# Patient Record
Sex: FEMALE | Race: WHITE | NOT HISPANIC OR LATINO
[De-identification: names, ages, dates, MRNs, and addresses within clinical notes are randomized per-mention and may not be internally consistent; named-entity substitution may affect disease eponyms.]

---

## 2022-04-04 PROBLEM — Z00.00 ENCOUNTER FOR PREVENTIVE HEALTH EXAMINATION: Status: ACTIVE | Noted: 2022-04-04

## 2022-04-06 DIAGNOSIS — Z87.19 PERSONAL HISTORY OF OTHER DISEASES OF THE DIGESTIVE SYSTEM: ICD-10-CM

## 2022-04-06 DIAGNOSIS — Z78.9 OTHER SPECIFIED HEALTH STATUS: ICD-10-CM

## 2022-04-06 DIAGNOSIS — Z86.39 PERSONAL HISTORY OF OTHER ENDOCRINE, NUTRITIONAL AND METABOLIC DISEASE: ICD-10-CM

## 2022-04-06 DIAGNOSIS — R73.03 PREDIABETES.: ICD-10-CM

## 2022-04-20 ENCOUNTER — NON-APPOINTMENT (OUTPATIENT)
Age: 67
End: 2022-04-20

## 2022-04-20 ENCOUNTER — APPOINTMENT (OUTPATIENT)
Dept: SURGERY | Facility: CLINIC | Age: 67
End: 2022-04-20
Payer: MEDICARE

## 2022-04-20 VITALS
DIASTOLIC BLOOD PRESSURE: 82 MMHG | WEIGHT: 131 LBS | BODY MASS INDEX: 30.32 KG/M2 | TEMPERATURE: 97.2 F | HEART RATE: 71 BPM | SYSTOLIC BLOOD PRESSURE: 146 MMHG | HEIGHT: 55 IN

## 2022-04-20 DIAGNOSIS — K64.5 PERIANAL VENOUS THROMBOSIS: ICD-10-CM

## 2022-04-20 PROCEDURE — 99203 OFFICE O/P NEW LOW 30 MIN: CPT

## 2022-04-20 NOTE — PHYSICAL EXAM
[JVD] : no jugular venous distention  [Normal Breath Sounds] : Normal breath sounds [Normal Rate and Rhythm] : normal rate and rhythm [No Rash or Lesion] : No rash or lesion [de-identified] : NAD, well appearing [de-identified] : soft, NT/ND [de-identified] : large thrombosed external hemorrhoid, internal hemorrhoids on anascopy

## 2022-04-20 NOTE — HISTORY OF PRESENT ILLNESS
[de-identified] : 66 yo F presenting for evaluation of hemorrhoids [de-identified] : Patient states she has had the hemorrhoids for three months.  Noted to have thrombosed hemorrhoid by her pcp and given wipes and cream but with minimal relief.  Patient states she has always had issues with constipation.  She takes senna as needed.  Also reports anal bleeding and discomfort with sitting.

## 2022-04-20 NOTE — CONSULT LETTER
[Dear  ___] : Dear ~TIN, [( Thank you for referring [unfilled] for consultation for _____ )] : Thank you for referring [unfilled] for consultation for [unfilled] [Please see my note below.] : Please see my note below. [Referral Closing:] : Thank you very much for seeing this patient.  If you have any questions, please do not hesitate to contact me. [Sincerely,] : Sincerely, [FreeTextEntry3] : [place] Gongora

## 2022-05-13 ENCOUNTER — APPOINTMENT (OUTPATIENT)
Dept: SURGERY | Facility: CLINIC | Age: 67
End: 2022-05-13
Payer: MEDICARE

## 2022-05-13 VITALS
TEMPERATURE: 97.3 F | SYSTOLIC BLOOD PRESSURE: 115 MMHG | HEART RATE: 81 BPM | WEIGHT: 129 LBS | BODY MASS INDEX: 29.85 KG/M2 | HEIGHT: 55 IN | DIASTOLIC BLOOD PRESSURE: 71 MMHG

## 2022-05-13 PROCEDURE — 99212 OFFICE O/P EST SF 10 MIN: CPT

## 2022-05-13 NOTE — PHYSICAL EXAM
[JVD] : no jugular venous distention  [Normal Breath Sounds] : Normal breath sounds [Normal Rate and Rhythm] : normal rate and rhythm [No Rash or Lesion] : No rash or lesion [Calm] : calm [de-identified] : nad [de-identified] : soft, NT [de-identified] : large thrombosed external hemorrhoid with no change in appearance from prior exam

## 2022-05-13 NOTE — CONSULT LETTER
[Dear  ___] : Dear  [unfilled], [Referral Letter:] : I am referring [unfilled] to you for further evaluation.  My most recent evaluation follows. [Please see my note below.] : Please see my note below. [Referral Closing:] : Thank you very much for seeing this patient.  If you have any questions, please do not hesitate to contact me. [Sincerely,] : Sincerely, [FreeTextEntry3] : [place] Gongora

## 2022-05-13 NOTE — HISTORY OF PRESENT ILLNESS
[de-identified] : thrombosed hemorrhoid [de-identified] : 66 yo F with pain, and bleeding from thrombosed hemorrhoid last seen in april.  Patient states she has been doing daily sitz baths and fiber supplements.  Also topical lidocaine cream with minimal relief.  Still having very bothersome bleeding.

## 2022-05-23 ENCOUNTER — APPOINTMENT (OUTPATIENT)
Dept: SURGERY | Facility: CLINIC | Age: 67
End: 2022-05-23
Payer: MEDICARE

## 2022-05-23 VITALS
WEIGHT: 129 LBS | SYSTOLIC BLOOD PRESSURE: 115 MMHG | HEIGHT: 55 IN | HEART RATE: 81 BPM | BODY MASS INDEX: 29.85 KG/M2 | DIASTOLIC BLOOD PRESSURE: 71 MMHG | TEMPERATURE: 97.1 F

## 2022-05-23 PROCEDURE — 11104 PUNCH BX SKIN SINGLE LESION: CPT

## 2022-05-23 PROCEDURE — 99213 OFFICE O/P EST LOW 20 MIN: CPT | Mod: 25

## 2022-05-23 NOTE — PHYSICAL EXAM
[Abdomen Masses] : No abdominal masses [Abdomen Tenderness] : ~T No ~M abdominal tenderness [Normal] : was normal [None] : no [JVD] : no jugular venous distention  [Respiratory Effort] : normal respiratory effort [Alert] : alert [Calm] : calm [de-identified] : Large perianal mass, anterior based, approximately 4 cm in size at greatest dimension, arises from anal verge extending barely into anal canal. Nontender, minimal sensation on palpation.  [de-identified] : No acute distress [de-identified] : No lymphadenopathy appreciated in either groin.

## 2022-05-23 NOTE — PROCEDURE
[FreeTextEntry1] : Skin punch biopsy used to take full thickness biopsy of mass. Patient tolerated procedure without issue. Biopsy site cauterized with silver nitrate.

## 2022-05-23 NOTE — ASSESSMENT
[FreeTextEntry1] : 67 year old female with enlarging perianal mass, most likely consistent with invasive anal cancer. \par \par Biopsy pending for diagnosis. Discussed with patient and family potential for invasive anal cancer. If pathology consistent will need further staging workup to then decide next steps including chemotherapy and radiation. \par \par If biopsy is inadequate or inconclusive will potentially need exam under anesthesia for a larger specimen for definitive diagnosis. Will discuss her case with Dr Hutchins, his office number was provided. \par \par Await pathology results.\par Follow up to discuss results and next steps.

## 2022-05-23 NOTE — HISTORY OF PRESENT ILLNESS
[FreeTextEntry1] : 67 year old female here for initial visit for perianal mass, previously seen by Dr Gongora for possible thrombosed hemorrhoid. Symptoms were not improving on follow up so she was referred here for concern that this was not hemorrhoid related and potentially consistent with a mass. \par \par Patient had colonoscopy in August of last year. Believes she had these same symptoms at that time but smaller. Has bleeding with bowel movements, also has bleeding through her underwear sometimes. Seen by PCP in march who noted a 2cm perianal mass consistent with thrombosed hemorrhoid at that time. Since then patient's symptoms have not improved and she believes this lump is getting bigger. Has not noted any swelling in the groins. \par \par No family history of cancer. Medical history significant for prediabetes.

## 2022-05-27 ENCOUNTER — TRANSCRIPTION ENCOUNTER (OUTPATIENT)
Age: 67
End: 2022-05-27

## 2022-05-31 ENCOUNTER — APPOINTMENT (OUTPATIENT)
Dept: RADIATION ONCOLOGY | Facility: CLINIC | Age: 67
End: 2022-05-31
Payer: MEDICARE

## 2022-05-31 VITALS
HEART RATE: 69 BPM | DIASTOLIC BLOOD PRESSURE: 68 MMHG | BODY MASS INDEX: 29.39 KG/M2 | HEIGHT: 55 IN | RESPIRATION RATE: 17 BRPM | TEMPERATURE: 97.1 F | WEIGHT: 127 LBS | OXYGEN SATURATION: 97 % | SYSTOLIC BLOOD PRESSURE: 108 MMHG

## 2022-05-31 DIAGNOSIS — Z87.19 PERSONAL HISTORY OF OTHER DISEASES OF THE DIGESTIVE SYSTEM: ICD-10-CM

## 2022-05-31 PROCEDURE — 99205 OFFICE O/P NEW HI 60 MIN: CPT | Mod: 25

## 2022-05-31 NOTE — VITALS
[Maximal Pain Intensity: 3/10] : 3/10 [Least Pain Intensity: 2/10] : 2/10 [NoTreatment Scheduled] : no treatment scheduled [90: Able to carry normal activity; minor signs or symptoms of disease.] : 90: Able to carry normal activity; minor signs or symptoms of disease.  [90: Minor restrictions in physically strenous activity.] : 90: Minor restrictions in physically strenuous activity. [ECOG Performance Status: 1 - Restricted in physically strenuous activity but ambulatory and able to carry out work of a light or sedentary nature] : Performance Status: 1 - Restricted in physically strenuous activity but ambulatory and able to carry out work of a light or sedentary nature, e.g., light house work, office work

## 2022-06-01 PROBLEM — Z87.19 HISTORY OF HEMORRHOIDS: Status: RESOLVED | Noted: 2022-04-20 | Resolved: 2022-06-01

## 2022-06-01 NOTE — PHYSICAL EXAM
[] : no respiratory distress [Abdomen Soft] : soft [Nondistended] : nondistended [Abdomen Tenderness] : non-tender [Normal Vagina] : normal vagina without lesions or masses [Cervical Lymph Nodes Enlarged Posterior Bilaterally] : posterior cervical [Cervical Lymph Nodes Enlarged Anterior Bilaterally] : anterior cervical [Supraclavicular Lymph Nodes Enlarged Bilaterally] : supraclavicular [Axillary Lymph Nodes Enlarged Bilaterally] : axillary [Inguinal Lymph Nodes Enlarged Bilaterally] : inguinal [Normal] : oriented to person, place and time, the affect was normal, the mood was normal and not anxious [FreeTextEntry1] : Fungating friable mass originating from the anterior anal verge without extension into the anal canal; no masses within the anal canal; the lesion does no obstruct the anal canal and I was able to advance my examining finger beyond the mass into the anorectum; there is evidence of mild bleeding on the maxi pad patient is using [de-identified] : No palpable vaginal masses; no vaginal bleeding noted [de-identified] : cranial nerves grossly intact; 4/5 strength right upper and lower extremities; strength is intact on the left; no sensory changes; intact finger-nose-finger and rapid alternating movements

## 2022-06-01 NOTE — REASON FOR VISIT
[Other: ___] : [unfilled] [Consideration for Non-Curative Therapy] : consideration for non-curative therapy for

## 2022-06-01 NOTE — REVIEW OF SYSTEMS
[Abdominal Pain] : abdominal pain [Constipation] : constipation [Negative] : Allergic/Immunologic [de-identified] : Weakness on the right side

## 2022-06-01 NOTE — HISTORY OF PRESENT ILLNESS
[FreeTextEntry1] : This is a 67 year-old woman with a new diagnosis of anal melanoma.\par \par The patient presented with what she thought was a hemorrhoid. \par \par She was evaluated and referred to Dr. Jordan for further evaluation as the lesion at the anal verge appeared cancerous. The patient underwent biopsy by Dr. Jordan on 5/23 for what appeared to be an anal cancer.\par \par Before the pathology resulted, the patient's family brought the patient to the ED on 5/25 because she experiencing episodes of confusion and right-sided weakness.\par \par MRI of brain performed on 5/25/2022. abnormal T2/FLAIR signal in the left caudate head, left caudate body, left corona radiata, left frontal subcortical white matter, left anterior/ inferior frontal cortex, and left temporal cortex with abnormal patchy and nodular enhancement on postcontrast image. No significant regional mass effect. No abnormal DWI signal within the corresponding areas. Considerations include subacute infarcts, metastatic disease, or post infectious etiologies.  \par \par CT of chest, abdomen and pelvis was done on 5/26/2022. Poorly delineated primary anal cancer with presumed pulmonary and hepatic metastases. Suspicious left inguinal lymph node. \par \par Pathology on 5/23/2022 report revealed, "anus mass, biopsy; Malignant melanoma;  immunihistochemical stains performed to revealed tumor cells to be positive with melanocytic markers, S100, MART-1 and HMB-45. Pancytokeratin, p40, p16, and neuroendocrine markers (chromogranin and synaptophysin) are negative. Ki-67 proliferative index is high (up to 60%). CD45 immunihistochemical stain highlight background inflammatory cells. \par \par The patient presents today to discuss the role of palliative radiation in her management. She was started on steoirds in the hospital which her family states has improved her neurological symptoms. She is less confused and her right sided weakness is improved.\par \par The patient has not had a BM in 5 days but she states that she does not have difficulty passing stool, she just feels constipated. She has abdominal pain but no pain in the anal region. She does have mild bleeding from the mass but has not required blood transfusions and does not feel fatigues.

## 2022-06-02 ENCOUNTER — APPOINTMENT (OUTPATIENT)
Dept: HEMATOLOGY ONCOLOGY | Facility: CLINIC | Age: 67
End: 2022-06-02
Payer: MEDICARE

## 2022-06-02 VITALS
RESPIRATION RATE: 18 BRPM | SYSTOLIC BLOOD PRESSURE: 115 MMHG | BODY MASS INDEX: 29.16 KG/M2 | WEIGHT: 126 LBS | OXYGEN SATURATION: 64 % | DIASTOLIC BLOOD PRESSURE: 74 MMHG | HEART RATE: 69 BPM | HEIGHT: 55 IN | TEMPERATURE: 98.3 F

## 2022-06-02 DIAGNOSIS — Z80.0 FAMILY HISTORY OF MALIGNANT NEOPLASM OF DIGESTIVE ORGANS: ICD-10-CM

## 2022-06-02 DIAGNOSIS — Z72.89 OTHER PROBLEMS RELATED TO LIFESTYLE: ICD-10-CM

## 2022-06-02 DIAGNOSIS — Z80.8 FAMILY HISTORY OF MALIGNANT NEOPLASM OF OTHER ORGANS OR SYSTEMS: ICD-10-CM

## 2022-06-02 PROCEDURE — 99205 OFFICE O/P NEW HI 60 MIN: CPT

## 2022-06-02 RX ORDER — LORATADINE 10 MG
17 TABLET,DISINTEGRATING ORAL
Refills: 0 | Status: ACTIVE | COMMUNITY

## 2022-06-02 RX ORDER — LEVETIRACETAM 500 MG/1
500 TABLET, FILM COATED ORAL
Refills: 0 | Status: ACTIVE | COMMUNITY

## 2022-06-03 ENCOUNTER — NON-APPOINTMENT (OUTPATIENT)
Age: 67
End: 2022-06-03

## 2022-06-04 NOTE — REVIEW OF SYSTEMS
[Vision Problems] : vision problems [Constipation] : constipation [Confused] : confusion [Negative] : Allergic/Immunologic [FreeTextEntry7] : Anal pain [de-identified] : headaches, right sided weakness

## 2022-06-04 NOTE — ASSESSMENT
[FreeTextEntry1] : This a very pleasant 67-year-old woman with a newly diagnosed metastatic melanoma.  She was also recently found to have symptomatic brain mets, if not leptomeningeal disease.  Her symptoms have improved with the institution of corticosteroids.  She has been seen by radiation therapy and given the extent of her disease is being scheduled for whole brain radiotherapy.\par \par -I will obtain a PET/CT scan to assess the extent of her disease.\par -Full oncological laboratories are being sent at the time this office visit.\par -I have requested NGS testing on her pathological sample to include BRAF, KIT, NTRK, and TMB testing.\par -At this time, given the significantly symptomatic nature of her CNS disease I agree to move forward with radiation therapy.  I have discussed her care with her radiation oncologist and have asked that she be tapered off steroids as soon as possible.  If due immune therapy agents are considered the simultaneous use of corticosteroids may blunt the response to such agents.\par -Additionally, if she is found to have a BRAF mutation then this could be targeted during radiation along with the MEK inhibitor.\par -I had to, unfortunately, discussed the incurable nature of a metastatic melanoma but explained the palliative nature of treatment.  She, her son, and daughter-in-law expressed understanding and wish to move forward with her treatment.\par -She has been referred to the [facility] as well as to palliative care.\par \par Thank you very much for allow me participate in this woman's medical care.  She have any questions or concerns please not hesitate to call me directly.

## 2022-06-04 NOTE — HISTORY OF PRESENT ILLNESS
[0 - No Distress] : Distress Level: 0 [Disease: _____________________] : Disease: [unfilled] [AJCC Stage: ____] : AJCC Stage: [unfilled] [de-identified] : Mrs. Yoder is a 67 year old female who presents for initial consultation for newly diagnosed Metastatic Melanoma. \par She first came to medical attention approximately approximately 3 months ago when she began to note rectal bleeding.  Initially it was thought that she had thrombosed hemorrhoids, however, perianal mass was noted measuring approximately 2 cm.  On 5/23/2022 she underwent anal biopsy.  While awaiting the pathology results she began to experience lack of coordination, right-sided weakness and change in mental status with confusion.  MRI of the brain on 5/25/2022 which showed abnormal signal in the left caudate head, left caudate body, left corona radiata, left frontal subcortical white matter, left anterior/inferior frontal cortex and left temporal cortex considerations including metastatic disease.  CT of the chest, abdomen and pelvis on 5/26/2022 showed multiple solid and ground glass pulmonary nodules measuring less than 6 mm and multiple bilobar metastasis in the liver more numerous in the right hepatic lobe.  Pathology from the anal mass biopsy showed tumor cells positive for melanocytic markers consistent with malignant melanoma.  She consulted with radiation oncology and is not a candidate for gamma knife radiation oncology and will undergo palliative whole brain radiation as she is not a candidate for gamma knife because of multiple areas involved.  She has also been started on Decadron and reports some improvement in her neurologic symptoms but is experiencing headaches and visual field disturbances.  She denies any noticeable skin lesions and has not had previous dermatologic skin survey.  She reports having anal discomfort and has been constipated despite laxatives.

## 2022-06-08 ENCOUNTER — APPOINTMENT (OUTPATIENT)
Dept: NEUROSURGERY | Facility: CLINIC | Age: 67
End: 2022-06-08

## 2022-06-08 ENCOUNTER — APPOINTMENT (OUTPATIENT)
Dept: GERIATRICS | Facility: CLINIC | Age: 67
End: 2022-06-08
Payer: MEDICARE

## 2022-06-08 VITALS
DIASTOLIC BLOOD PRESSURE: 60 MMHG | SYSTOLIC BLOOD PRESSURE: 103 MMHG | WEIGHT: 127.31 LBS | TEMPERATURE: 97.6 F | HEART RATE: 67 BPM | RESPIRATION RATE: 16 BRPM | BODY MASS INDEX: 29.46 KG/M2 | HEIGHT: 55 IN | OXYGEN SATURATION: 99 %

## 2022-06-08 PROCEDURE — 99205 OFFICE O/P NEW HI 60 MIN: CPT

## 2022-06-08 RX ORDER — DOCUSATE SODIUM 100 MG/1
100 CAPSULE ORAL 3 TIMES DAILY
Qty: 270 | Refills: 3 | Status: ACTIVE | COMMUNITY
Start: 2022-06-08 | End: 1900-01-01

## 2022-06-08 RX ORDER — MIRTAZAPINE 15 MG/1
15 TABLET, FILM COATED ORAL
Qty: 30 | Refills: 0 | Status: ACTIVE | COMMUNITY
Start: 2022-06-08 | End: 1900-01-01

## 2022-06-08 RX ORDER — ACETAMINOPHEN 500 MG/1
500 TABLET, COATED ORAL 3 TIMES DAILY
Qty: 180 | Refills: 5 | Status: ACTIVE | COMMUNITY
Start: 2022-06-08 | End: 1900-01-01

## 2022-06-08 NOTE — HISTORY OF PRESENT ILLNESS
[FreeTextEntry1] : pt is a 68 y/o F with newly diagnosed metastatic melanoma here to establish care with palliative \par pt being followed by Dr. Hutchins. 3 months ago pt noted rectal bleeding though to be hemorrhoids, found to have perianal mass and underwent biopsy with results positive for malignant melanoma. Further imaging revealed metastatic disease in the Brain, lung, and liver. \par pt not a candidate for gamma knife and undergoing palliative whole brain radiation. has not started yet, awaiting appointmetn for simulation. \par \par pt with neurological symptoms of gait imbalance, headaches and visual disturbance, word finding.\par \par  reports pain in perianal area, constipation and perianal bleeding. \par pt unable to sleep at night stays awake reporting fear and sadness regarding her diagnosis \par no other symptoms\par \par pt lives with her  son Hi Ye and his wife and 2 small grandchildren \par \par pt is seperated from her , pt recently relocated from [place] to [place] to be with family \par \par \par \par \par

## 2022-06-08 NOTE — REVIEW OF SYSTEMS
[As Noted in HPI] : as noted in HPI [Feeling Poorly] : feeling poorly [Feeling Tired] : feeling tired [Negative] : Constitutional

## 2022-06-08 NOTE — PHYSICAL EXAM
[General Appearance - Alert] : alert [General Appearance - In No Acute Distress] : in no acute distress [General Appearance - Well Nourished] : well nourished [General Appearance - Well Developed] : well developed [General Appearance - Well-Appearing] : healthy appearing [Sclera] : the sclera and conjunctiva were normal [Normal Oral Mucosa] : normal oral mucosa [No Oral Pallor] : no oral pallor [Outer Ear] : the ears and nose were normal in appearance [Nasal Cavity] : the nasal mucosa and septum were normal [Respiration, Rhythm And Depth] : normal respiratory rhythm and effort [Exaggerated Use Of Accessory Muscles For Inspiration] : no accessory muscle use [Auscultation Breath Sounds / Voice Sounds] : lungs were clear to auscultation bilaterally [Heart Rate And Rhythm] : heart rate was normal and rhythm regular [Heart Sounds] : normal S1 and S2 [Murmurs] : no murmurs [Full Pulse] : the pedal pulses are present [Edema] : there was no peripheral edema [Abdomen Soft] : soft [Abdomen Tenderness] : non-tender [] : no hepato-splenomegaly [Abdomen Mass (___ Cm)] : no abdominal mass palpated [Abdomen Hernia] : no hernia was discovered [FreeTextEntry1] : + large perianal mass, 1cm x 5 cm x 5 cm with noted areas of bleeding, no sign of infection, partially cover anal opening friable tissue, rectum patent  [Abnormal Walk] : normal gait [Nail Clubbing] : no clubbing  or cyanosis of the fingernails [Involuntary Movements] : no involuntary movements were seen [Musculoskeletal - Swelling] : no joint swelling seen [Cranial Nerves] : cranial nerves 2-12 were intact [Oriented To Time, Place, And Person] : oriented to person, place, and time [Impaired Insight] : insight and judgment were intact

## 2022-06-08 NOTE — ASSESSMENT
[FreeTextEntry1] : pt is a pleasant woman with newly diagnosed metastatic melanoma with disease to brain, lung and liver currently awaiting to undergo full brain radiation on steroids, once tapered will start dual immunotherapy with Dr. Hutchins pps of 60% noted difficult word finding and memory loss\par \par pt reports her 2 sons alvarez and Aaron are her designated HCP\par will fill form--sons are medical surrogate by [facility]\par will cariilo [phone]\par aaron hearn-lives in [place]\par telephone family meeting 06/20/2022 arranged to discuss pt medical condition\par \par pain tylenol 1000 mg TID \par lidocaine to area small amount if possible right before defecation\par will keep stools soft \par added colace 100 mg TID\par senna 1 tab daily\par will hold off on opioids given pain currently well controlled on tylenol and to avoid exacerbation of her pain with constipation given mass location\par will consider possible interventional pain procedure with block depending on pt response with increased tylenol\par \par insomnia\par remeron 7.5 mg QHS \par f/u in one month or sooner as needed\par \par discussed in detail with Dr. hutchins\par  [Depression] : depression [Fall precautions] : fall precautions [Social support] : social support [Living arrangements] : living arrangements [Pain Management] : pain management [Medication Management] : medication management [de-identified] : pt santy hearn  Other specify

## 2022-06-08 NOTE — DATA REVIEWED
[FreeTextEntry1] : labs reviewed from hospitalization ]\par LFT normal\par kidney function normal \par cbc last hgb noted to trending down from 13.2 to 11.9 upon d/c from hospital \par

## 2022-06-09 ENCOUNTER — TRANSCRIPTION ENCOUNTER (OUTPATIENT)
Age: 67
End: 2022-06-09

## 2022-06-13 ENCOUNTER — NON-APPOINTMENT (OUTPATIENT)
Age: 67
End: 2022-06-13

## 2022-06-13 ENCOUNTER — APPOINTMENT (OUTPATIENT)
Dept: HEMATOLOGY ONCOLOGY | Facility: CLINIC | Age: 67
End: 2022-06-13
Payer: MEDICARE

## 2022-06-13 VITALS
HEART RATE: 66 BPM | SYSTOLIC BLOOD PRESSURE: 111 MMHG | WEIGHT: 129 LBS | TEMPERATURE: 97 F | BODY MASS INDEX: 29.98 KG/M2 | DIASTOLIC BLOOD PRESSURE: 70 MMHG | RESPIRATION RATE: 16 BRPM

## 2022-06-13 VITALS
SYSTOLIC BLOOD PRESSURE: 112 MMHG | BODY MASS INDEX: 29.39 KG/M2 | OXYGEN SATURATION: 99 % | WEIGHT: 127 LBS | DIASTOLIC BLOOD PRESSURE: 72 MMHG | RESPIRATION RATE: 18 BRPM | HEART RATE: 62 BPM | TEMPERATURE: 97.8 F | HEIGHT: 55 IN

## 2022-06-13 LAB
ERYTHROCYTE [SEDIMENTATION RATE] IN BLOOD BY WESTERGREN METHOD: 8 MM/HR
FERRITIN SERPL-MCNC: 95 NG/ML
FOLATE SERPL-MCNC: 10.7 NG/ML
HAPTOGLOB SERPL-MCNC: 291 MG/DL
IRON SATN MFR SERPL: 19 %
IRON SERPL-MCNC: 72 UG/DL
LDH SERPL-CCNC: 274 U/L
TIBC SERPL-MCNC: 370 UG/DL
TSH SERPL-ACNC: 1.98 UIU/ML
UIBC SERPL-MCNC: 298 UG/DL
VIT B12 SERPL-MCNC: 776 PG/ML

## 2022-06-13 PROCEDURE — 99214 OFFICE O/P EST MOD 30 MIN: CPT

## 2022-06-13 NOTE — HISTORY OF PRESENT ILLNESS
[Disease: _____________________] : Disease: [unfilled] [AJCC Stage: ____] : AJCC Stage: [unfilled] [0 - No Distress] : Distress Level: 0 [de-identified] : Mrs. Yoder is a 67 year old female who presents for initial consultation for newly diagnosed Metastatic Melanoma. \par She first came to medical attention approximately approximately 3 months ago when she began to note rectal bleeding.  Initially it was thought that she had thrombosed hemorrhoids, however, perianal mass was noted measuring approximately 2 cm.  On 5/23/2022 she underwent anal biopsy.  While awaiting the pathology results she began to experience lack of coordination, right-sided weakness and change in mental status with confusion.  MRI of the brain on 5/25/2022 which showed abnormal signal in the left caudate head, left caudate body, left corona radiata, left frontal subcortical white matter, left anterior/inferior frontal cortex and left temporal cortex considerations including metastatic disease.  CT of the chest, abdomen and pelvis on 5/26/2022 showed multiple solid and ground glass pulmonary nodules measuring less than 6 mm and multiple bilobar metastasis in the liver more numerous in the right hepatic lobe.  Pathology from the anal mass biopsy showed tumor cells positive for melanocytic markers consistent with malignant melanoma.  She consulted with radiation oncology and is not a candidate for gamma knife radiation oncology and will undergo palliative whole brain radiation as she is not a candidate for gamma knife because of multiple areas involved.  She has also been started on Decadron and reports some improvement in her neurologic symptoms but is experiencing headaches and visual field disturbances.  She denies any noticeable skin lesions and has not had previous dermatologic skin survey.  She reports having anal discomfort and has been constipated despite laxatives.  [de-identified] : She has started CNS RT and received fraction 3 of 10 today.  She continues to generally feel better in respect to her CNS symptoms.  However, her anal/rectal discomfort has increased since last visit and therefore radiation oncology decided to add radiation therapy to that area for better local control.  Her Decadron dose has not been decreased yet.  She has completed the ordered PET scan and is here to review these results.

## 2022-06-13 NOTE — ASSESSMENT
[FreeTextEntry1] : This a very pleasant 67-year-old woman with a newly diagnosed metastatic melanoma.  She was also recently found to have symptomatic brain mets, if not leptomeningeal disease.  Her symptoms have improved with the institution of corticosteroids.  She has been seen by radiation therapy and given the extent of her disease is being scheduled for whole brain radiotherapy.\par \par -I obtained a PET/CT scan that confirmed the hypermetabolic nature of the primary anal mass, inguinal lymph nodes, multiple hepatic metastasis, and likely pulmonary metastasis..\par -Full oncological laboratories were sent at the time the initial office visit.\par -I have requested NGS testing on her pathological sample to include BRAF, KIT, NTRK, and TMB testing.  This is not been reported on the electronic health record and I have inquired about these results directly to pathology.\par -At this time, given the significantly symptomatic nature of her CNS disease I agree with continuing on radiation therapy.  I have discussed her care with her radiation oncologist and have asked that she be tapered off steroids as soon as possible.  If due immune therapy agents are considered the simultaneous use of corticosteroids may blunt the response to such agents.\par -Additionally, if she is found to have a BRAF mutation then this could be targeted during radiation along with the MEK inhibitor.\par -I had to, unfortunately, discussed the incurable nature of a metastatic melanoma but explained the palliative nature of treatment.  She, and daughter-in-law expressed understanding and wish to move forward with her treatment.\par -She has been referred to the health and [facility] center as well as to palliative care, with whom she has had initial consultation already.

## 2022-06-13 NOTE — VITALS
[Maximal Pain Intensity: 8/10] : 8/10 [NSAID/Non-Opioid] : NSAID/Non-Opioid [70: Cares for self; unalbe to carry on normal activity or do active work.] : 70: Cares for self; unable to carry on normal activity or do active work.

## 2022-06-13 NOTE — REVIEW OF SYSTEMS
[Vision Problems] : vision problems [Constipation] : constipation [Confused] : confusion [Negative] : Allergic/Immunologic [FreeTextEntry7] : Anal pain [de-identified] : headaches, right sided weakness

## 2022-06-14 NOTE — HISTORY OF PRESENT ILLNESS
[FreeTextEntry1] : This is a 67 year-old woman with a new diagnosis of anal melanoma.\par \par The patient presented with what she thought was a hemorrhoid. \par \par She was evaluated and referred to Dr. Jordan for further evaluation as the lesion at the anal verge appeared cancerous. The patient underwent biopsy by Dr. Jordan on 5/23 for what appeared to be an anal cancer.\par \par Before the pathology resulted, the patient's family brought the patient to the ED on 5/25 because she experiencing episodes of confusion and right-sided weakness.\par \par \par

## 2022-06-14 NOTE — DISEASE MANAGEMENT
[Clinical] : TNM Stage: c [IV] : IV [TTNM] : x [NTNM] : x [MTNM] : 1 [de-identified] : whole brain Fx 3/10 900 cGy

## 2022-06-14 NOTE — REVIEW OF SYSTEMS
[Abdominal Pain] : abdominal pain [Constipation] : constipation [Negative] : Allergic/Immunologic [de-identified] : Weakness on the right side

## 2022-06-20 ENCOUNTER — NON-APPOINTMENT (OUTPATIENT)
Age: 67
End: 2022-06-20

## 2022-06-20 ENCOUNTER — APPOINTMENT (OUTPATIENT)
Dept: HEMATOLOGY ONCOLOGY | Facility: CLINIC | Age: 67
End: 2022-06-20
Payer: MEDICARE

## 2022-06-20 ENCOUNTER — APPOINTMENT (OUTPATIENT)
Dept: GERIATRICS | Facility: CLINIC | Age: 67
End: 2022-06-20

## 2022-06-20 VITALS
DIASTOLIC BLOOD PRESSURE: 65 MMHG | TEMPERATURE: 98.3 F | RESPIRATION RATE: 18 BRPM | HEIGHT: 55 IN | BODY MASS INDEX: 31.7 KG/M2 | WEIGHT: 137 LBS | SYSTOLIC BLOOD PRESSURE: 94 MMHG | OXYGEN SATURATION: 97 % | HEART RATE: 80 BPM

## 2022-06-20 VITALS
WEIGHT: 128 LBS | HEIGHT: 55 IN | SYSTOLIC BLOOD PRESSURE: 100 MMHG | RESPIRATION RATE: 17 BRPM | DIASTOLIC BLOOD PRESSURE: 70 MMHG | HEART RATE: 93 BPM | BODY MASS INDEX: 29.62 KG/M2 | OXYGEN SATURATION: 97 % | TEMPERATURE: 97.5 F

## 2022-06-20 PROCEDURE — 36415 COLL VENOUS BLD VENIPUNCTURE: CPT

## 2022-06-20 PROCEDURE — 99214 OFFICE O/P EST MOD 30 MIN: CPT | Mod: 25

## 2022-06-20 NOTE — REVIEW OF SYSTEMS
[Nausea: Grade 1 - Loss of appetite without alteration in eating habits] : Nausea: Grade 1 - Loss of appetite without alteration in eating habits [Vomiting: Grade 0] : Vomiting: Grade 0 [Fatigue: Grade 1 - Fatigue relieved by rest] : Fatigue: Grade 1 - Fatigue relieved by rest [Blurred Vision: Grade 1 - Intervention not indicated] : Blurred Vision: Grade 1 - Intervention not indicated [Oral Pain: Grade 0] : Oral Pain: Grade 0 [Dizziness: Grade 1 - Mild unsteadiness or sensation of movement] : Dizziness: Grade 1 - Mild unsteadiness or sensation of movement [Headache: Grade 0] : Headache: Grade 0 [Pruritus: Grade 0] : Pruritus: Grade 0 [Skin Hyperpigmentation: Grade 0] : Skin Hyperpigmentation: Grade 0 [Dermatitis Radiation: Grade 0] : Dermatitis Radiation: Grade 0 [Abdominal Pain] : abdominal pain [Constipation] : constipation [Negative] : Allergic/Immunologic [de-identified] : Weakness on the right side

## 2022-06-20 NOTE — DISEASE MANAGEMENT
[Clinical] : TNM Stage: c [IV] : IV [TTNM] : x [NTNM] : x [MTNM] : 1 [de-identified] : Patient is completed fractions 8/10, 2400 cGy to her whole brain and 1/5, 400 cGy to her pelvis.

## 2022-06-20 NOTE — PHYSICAL EXAM
[Sclera] : the sclera and conjunctiva were normal [PERRL With Normal Accommodation] : pupils were equal in size, round, reactive to light [Extraocular Movements] : extraocular movements were intact [] : no respiratory distress [Normal] : oriented to person, place and time, the affect was normal, the mood was normal and not anxious [de-identified] : Improvement in right sided weakness; strength in both upper and lower extremities is now intact (5/5)

## 2022-06-20 NOTE — VITALS
[Maximal Pain Intensity: 3/10] : 3/10 [NoTreatment Scheduled] : no treatment scheduled [Least Pain Intensity: 0/10] : 0/10 [80: Normal activity with effort; some signs or symptoms of disease.] : 80: Normal activity with effort; some signs or symptoms of disease.

## 2022-06-22 ENCOUNTER — NON-APPOINTMENT (OUTPATIENT)
Age: 67
End: 2022-06-22

## 2022-06-22 VITALS
HEART RATE: 82 BPM | HEIGHT: 55 IN | BODY MASS INDEX: 31.7 KG/M2 | TEMPERATURE: 97.8 F | WEIGHT: 137 LBS | SYSTOLIC BLOOD PRESSURE: 116 MMHG | RESPIRATION RATE: 18 BRPM | DIASTOLIC BLOOD PRESSURE: 74 MMHG | OXYGEN SATURATION: 100 %

## 2022-06-22 NOTE — PHYSICAL EXAM
[Normal] : well developed, well nourished, in no acute distress [] : no respiratory distress [FreeTextEntry1] : No change in known anal mass; continues with oozing blood; no dermatitis related to RT present

## 2022-06-22 NOTE — REVIEW OF SYSTEMS
[Anal Pain: Grade 2 - Moderate pain; limiting instrumental ADL] : Anal Pain: Grade 2 - Moderate pain; limiting instrumental ADL [Constipation: Grade 2 - Persistent symptoms with regular use of laxatives or enemas; limiting instrumental ADL] : Constipation: Grade 2 - Persistent symptoms with regular use of laxatives or enemas; limiting instrumental ADL [Nausea: Grade 1 - Loss of appetite without alteration in eating habits] : Nausea: Grade 1 - Loss of appetite without alteration in eating habits [Vomiting: Grade 0] : Vomiting: Grade 0 [Fatigue: Grade 1 - Fatigue relieved by rest] : Fatigue: Grade 1 - Fatigue relieved by rest [Blurred Vision: Grade 1 - Intervention not indicated] : Blurred Vision: Grade 1 - Intervention not indicated [Oral Pain: Grade 0] : Oral Pain: Grade 0 [Dizziness: Grade 1 - Mild unsteadiness or sensation of movement] : Dizziness: Grade 1 - Mild unsteadiness or sensation of movement [Headache: Grade 0] : Headache: Grade 0 [Pruritus: Grade 1 - Mild or localized; topical intervention indicated] : Pruritus: Grade 1 - Mild or localized; topical intervention indicated [Abdominal Pain] : abdominal pain [Constipation] : constipation [Negative] : Allergic/Immunologic [Skin Hyperpigmentation: Grade 0] : Skin Hyperpigmentation: Grade 0 [Dermatitis Radiation: Grade 0] : Dermatitis Radiation: Grade 0 [de-identified] : Weakness on the right side

## 2022-06-22 NOTE — VITALS
[Maximal Pain Intensity: 3/10] : 3/10 [Least Pain Intensity: 2/10] : 2/10 [NoTreatment Scheduled] : no treatment scheduled [80: Normal activity with effort; some signs or symptoms of disease.] : 80: Normal activity with effort; some signs or symptoms of disease.

## 2022-06-22 NOTE — DISEASE MANAGEMENT
[Clinical] : TNM Stage: c [IV] : IV [TTNM] : x [NTNM] : x [MTNM] : 1 [de-identified] : Patient is completed fractions 10/10, 3000 cGy to her whole brain and 3/5, 1200 cGy to her pelvis.

## 2022-06-24 NOTE — HISTORY OF PRESENT ILLNESS
[Disease: _____________________] : Disease: [unfilled] [AJCC Stage: ____] : AJCC Stage: [unfilled] [0 - No Distress] : Distress Level: 0 [de-identified] : Mrs. Yoder is a 67 year old female who presents for initial consultation for newly diagnosed Metastatic Melanoma. \par She first came to medical attention approximately approximately 3 months ago when she began to note rectal bleeding.  Initially it was thought that she had thrombosed hemorrhoids, however, perianal mass was noted measuring approximately 2 cm.  On 5/23/2022 she underwent anal biopsy.  While awaiting the pathology results she began to experience lack of coordination, right-sided weakness and change in mental status with confusion.  MRI of the brain on 5/25/2022 which showed abnormal signal in the left caudate head, left caudate body, left corona radiata, left frontal subcortical white matter, left anterior/inferior frontal cortex and left temporal cortex considerations including metastatic disease.  CT of the chest, abdomen and pelvis on 5/26/2022 showed multiple solid and ground glass pulmonary nodules measuring less than 6 mm and multiple bilobar metastasis in the liver more numerous in the right hepatic lobe.  Pathology from the anal mass biopsy showed tumor cells positive for melanocytic markers consistent with malignant melanoma.  She consulted with radiation oncology and is not a candidate for gamma knife radiation oncology and will undergo palliative whole brain radiation as she is not a candidate for gamma knife because of multiple areas involved.  She has also been started on Decadron and reports some improvement in her neurologic symptoms but is experiencing headaches and visual field disturbances.  She denies any noticeable skin lesions and has not had previous dermatologic skin survey.  She reports having anal discomfort and has been constipated despite laxatives.  [de-identified] : The pt presents for follow up of metastatic melanoma to anal canal competing RT to whole brain and pelvis this week. She reports improvement in her neurologic symptoms and is being weaned off of steroids. She was seen by palliative medicine and started on Tylenol, topical Lidocaine and Remeron.  She is having rectal pruritus,irritation and bleeding despite topical hydrocortisone and her other meds and was prescribed Percocet by rad onc.

## 2022-06-24 NOTE — ASSESSMENT
[FreeTextEntry1] : This a very pleasant 67-year-old woman with a newly diagnosed metastatic melanoma.  She was also recently found to have symptomatic brain mets, if not leptomeningeal disease.  Her symptoms have improved with the institution of corticosteroids.  She has been seen by radiation therapy and given the extent of her disease is being scheduled for whole brain radiotherapy.\par \par -I will obtain a PET/CT scan to assess the extent of her disease.\par -Full oncological laboratories are being sent at the time this office visit.\par -I have requested NGS testing on her pathological sample to include BRAF, KIT, NTRK, and TMB testing.\par -At this time, given the significantly symptomatic nature of her CNS disease I agree to move forward with radiation therapy.  I have discussed her care with her radiation oncologist and have asked that she be tapered off steroids as soon as possible.  If due immune therapy agents are considered the simultaneous use of corticosteroids may blunt the response to such agents.\par -Additionally, if she is found to have a BRAF mutation then this could be targeted during radiation along with the MEK inhibitor.\par -I had to, unfortunately, discussed the incurable nature of a metastatic melanoma but explained the palliative nature of treatment.  She, her son, and daughter-in-law expressed understanding and wish to move forward with her treatment.\par -She has been referred to the [facility] as well as to palliative care.\par She will complete whole brain RT and Rt to the pelvis. \par We discussed results of NGS testing as in results section.\par We will proceed with Opdivo/Yervoy next week.  Side effects, toxicities and benefits were reviewed at length.  Potential toxicities of immunotherapy were discussed with the patient and her son who also received drug information sheets.  \par Reviewed available labs which were unremarkable.\par Continue routine, age-appropriate, healthcare maintenance \par History of present illness, review of systems, physical exam and treatment plan reviewed with .\par Office visit in 1 week or prn for new or worsening symptoms. \par \par

## 2022-06-24 NOTE — RESULTS/DATA
[FreeTextEntry1] : WBC 8.11\par HGB 13.8\par HCT 43.2\par ,000\par \par \par Addendum #1              Entered: 06/13/\par At the request of Dr. Hutchins, NGS testing is performed.\par [facility] Advanced Melanoma NGS results as reported by [facility] are as follows (See [facility]\par report for complete details):\par  \par DETECTED GENOMIC ALTERATIONS:\par Tier II: Varinats of Potential Clinical Significance\par CTNNB1 p. ([id number])\par MAP2K1 p.([id number])\par  \par  \par ADDENDUM                         (Continued)\par  \par IMMUNOTHERAPY BIOMARKERS:\par TUMOR MUTATION BURDEN: LOW (7.1 MUTATIONS/MB)\par MICROSATELLITE INSTABILITY: MSI NEGATIVE\par  \par PERTINENT NEGATIVE RESULTS:\par The following genes are NEGATIVE for clinically relevant mutations.  Mutational hotspots\par and surrounding exonic regions were interrogated for DNA level point mutations and indels\par (fusions not assayed).\par BAP1, BRAF, BRCA2, CDK4, CDKN2A, EIF1AX, GNA11, GNAQ, GNAS, KIT, MITF, NBN, NF1, NRAS,\par NTRK1, PALB2, PDGFRA, PIK3CA, PTEN, RB1, SF3B1, SMO, SRC, TERT, TP53\par Zenaida Thorpe MD\par 06/13/22 - [address]\par  \par

## 2022-06-24 NOTE — REVIEW OF SYSTEMS
[Vision Problems] : vision problems [Constipation] : constipation [Confused] : confusion [Negative] : Allergic/Immunologic [FreeTextEntry7] : Anal pain [de-identified] : headaches, right sided weakness improved

## 2022-06-27 ENCOUNTER — APPOINTMENT (OUTPATIENT)
Dept: HEMATOLOGY ONCOLOGY | Facility: CLINIC | Age: 67
End: 2022-06-27

## 2022-06-27 VITALS
HEART RATE: 87 BPM | OXYGEN SATURATION: 98 % | BODY MASS INDEX: 29.39 KG/M2 | RESPIRATION RATE: 18 BRPM | SYSTOLIC BLOOD PRESSURE: 103 MMHG | HEIGHT: 55 IN | TEMPERATURE: 98.6 F | WEIGHT: 127 LBS | DIASTOLIC BLOOD PRESSURE: 68 MMHG

## 2022-06-27 PROCEDURE — 99214 OFFICE O/P EST MOD 30 MIN: CPT

## 2022-06-27 NOTE — ASSESSMENT
[FreeTextEntry1] : This a very pleasant 67-year-old woman with a newly diagnosed metastatic melanoma.  She was also recently found to have symptomatic brain mets, if not leptomeningeal disease.  Her symptoms have improved with the institution of corticosteroids.  She has been seen by radiation therapy and given the extent of her disease is being scheduled for whole brain radiotherapy.\par \par -I obtained a PET/CT scan to assess the extent of her disease.\par -Full oncological laboratories were sent at the time this office visit.\par -I have requested NGS testing on her pathological sample to include BRAF, KIT, NTRK, and TMB testing.  This revealed no evidence of a BRAF mutation.  Given that she has been off of corticosteroid therapy for approximately 1 week we will plan to initiate her on dual agent immune inhibition to begin later this week.  At the benefits, risks, and side effects were discussed with the patient and her 2 sons present at today's office visit and they wish to proceed.\par -She has now completed radiotherapy for both the CNS metastasis and to the primary lesion.  There appears to be significant clinical improvement in both areas.  She is now off of corticosteroid therapy for approximately 1 week.\par -I had to, unfortunately, discussed the incurable nature of a metastatic melanoma but explained the palliative nature of treatment.  She, her son, and daughter-in-law expressed understanding and wish to move forward with her treatment.\par -She has been referred to the [facility] center as well as to palliative care.\par -She will return later this week to start immunotherapy and will follow up with me 1 week after that.

## 2022-06-27 NOTE — HISTORY OF PRESENT ILLNESS
[de-identified] : Mrs. Yoder is a 67 year old female who presents for initial consultation for newly diagnosed Metastatic Melanoma. \par She first came to medical attention approximately approximately 3 months ago when she began to note rectal bleeding.  Initially it was thought that she had thrombosed hemorrhoids, however, perianal mass was noted measuring approximately 2 cm.  On 5/23/2022 she underwent anal biopsy.  While awaiting the pathology results she began to experience lack of coordination, right-sided weakness and change in mental status with confusion.  MRI of the brain on 5/25/2022 which showed abnormal signal in the left caudate head, left caudate body, left corona radiata, left frontal subcortical white matter, left anterior/inferior frontal cortex and left temporal cortex considerations including metastatic disease.  CT of the chest, abdomen and pelvis on 5/26/2022 showed multiple solid and ground glass pulmonary nodules measuring less than 6 mm and multiple bilobar metastasis in the liver more numerous in the right hepatic lobe.  Pathology from the anal mass biopsy showed tumor cells positive for melanocytic markers consistent with malignant melanoma.  She consulted with radiation oncology and is not a candidate for gamma knife radiation oncology and will undergo palliative whole brain radiation as she is not a candidate for gamma knife because of multiple areas involved.  She has also been started on Decadron and reports some improvement in her neurologic symptoms but is experiencing headaches and visual field disturbances.  She denies any noticeable skin lesions and has not had previous dermatologic skin survey.  She reports having anal discomfort and has been constipated despite laxatives.  [de-identified] : She has now completed both CNS radiation therapy and RT for the anal lesion.  She reports a significant improvement in comfort in the anorectal area.  Her Decadron has been tapered to off for approximately 1 week now with no recurrence in symptoms.

## 2022-07-13 ENCOUNTER — RESULT REVIEW (OUTPATIENT)
Age: 67
End: 2022-07-13

## 2022-07-13 ENCOUNTER — APPOINTMENT (OUTPATIENT)
Dept: HEMATOLOGY ONCOLOGY | Facility: CLINIC | Age: 67
End: 2022-07-13

## 2022-07-13 VITALS
TEMPERATURE: 97.4 F | BODY MASS INDEX: 28.23 KG/M2 | OXYGEN SATURATION: 98 % | HEART RATE: 78 BPM | RESPIRATION RATE: 18 BRPM | HEIGHT: 55 IN | DIASTOLIC BLOOD PRESSURE: 75 MMHG | WEIGHT: 122 LBS | SYSTOLIC BLOOD PRESSURE: 116 MMHG

## 2022-07-13 PROCEDURE — 99214 OFFICE O/P EST MOD 30 MIN: CPT

## 2022-07-14 ENCOUNTER — NON-APPOINTMENT (OUTPATIENT)
Age: 67
End: 2022-07-14

## 2022-07-14 NOTE — HISTORY OF PRESENT ILLNESS
[Disease: _____________________] : Disease: [unfilled] [AJCC Stage: ____] : AJCC Stage: [unfilled] [0 - No Distress] : Distress Level: 0 [de-identified] : Mrs. Yoder is a 67 year old female who presents for initial consultation for newly diagnosed Metastatic Melanoma. \par She first came to medical attention approximately approximately 3 months ago when she began to note rectal bleeding.  Initially it was thought that she had thrombosed hemorrhoids, however, perianal mass was noted measuring approximately 2 cm.  On 5/23/2022 she underwent anal biopsy.  While awaiting the pathology results she began to experience lack of coordination, right-sided weakness and change in mental status with confusion.  MRI of the brain on 5/25/2022 which showed abnormal signal in the left caudate head, left caudate body, left corona radiata, left frontal subcortical white matter, left anterior/inferior frontal cortex and left temporal cortex considerations including metastatic disease.  CT of the chest, abdomen and pelvis on 5/26/2022 showed multiple solid and ground glass pulmonary nodules measuring less than 6 mm and multiple bilobar metastasis in the liver more numerous in the right hepatic lobe.  Pathology from the anal mass biopsy showed tumor cells positive for melanocytic markers consistent with malignant melanoma.  She consulted with radiation oncology and is not a candidate for gamma knife radiation oncology and will undergo palliative whole brain radiation as she is not a candidate for gamma knife because of multiple areas involved.  She has also been started on Decadron and reports some improvement in her neurologic symptoms but is experiencing headaches and visual field disturbances.  She denies any noticeable skin lesions and has not had previous dermatologic skin survey.  She reports having anal discomfort and has been constipated despite laxatives.  [de-identified] : She has now completed both CNS radiation therapy and RT for the anal lesion.  She reports a significant improvement in comfort in the anorectal area.  Her Decadron has been tapered to off for approximately 1 week now with no recurrence in symptoms.

## 2022-07-14 NOTE — REVIEW OF SYSTEMS
[Vision Problems] : vision problems [Constipation] : constipation [Confused] : confusion [Negative] : Allergic/Immunologic [FreeTextEntry7] : Anal pain [de-identified] : headaches, right sided weakness

## 2022-07-18 ENCOUNTER — APPOINTMENT (OUTPATIENT)
Dept: GERIATRICS | Facility: CLINIC | Age: 67
End: 2022-07-18

## 2022-07-20 ENCOUNTER — NON-APPOINTMENT (OUTPATIENT)
Age: 67
End: 2022-07-20

## 2022-07-20 NOTE — HISTORY OF PRESENT ILLNESS
[Disease: _____________________] : Disease: [unfilled] [AJCC Stage: ____] : AJCC Stage: [unfilled] [0 - No Distress] : Distress Level: 0 [de-identified] : Mrs. Yoder is a 67 year old female who presents for initial consultation for newly diagnosed Metastatic Melanoma. \par She first came to medical attention approximately approximately 3 months ago when she began to note rectal bleeding.  Initially it was thought that she had thrombosed hemorrhoids, however, perianal mass was noted measuring approximately 2 cm.  On 5/23/2022 she underwent anal biopsy.  While awaiting the pathology results she began to experience lack of coordination, right-sided weakness and change in mental status with confusion.  MRI of the brain on 5/25/2022 which showed abnormal signal in the left caudate head, left caudate body, left corona radiata, left frontal subcortical white matter, left anterior/inferior frontal cortex and left temporal cortex considerations including metastatic disease.  CT of the chest, abdomen and pelvis on 5/26/2022 showed multiple solid and ground glass pulmonary nodules measuring less than 6 mm and multiple bilobar metastasis in the liver more numerous in the right hepatic lobe.  Pathology from the anal mass biopsy showed tumor cells positive for melanocytic markers consistent with malignant melanoma.  She consulted with radiation oncology and is not a candidate for gamma knife radiation oncology and will undergo palliative whole brain radiation as she is not a candidate for gamma knife because of multiple areas involved.  She has also been started on Decadron and reports some improvement in her neurologic symptoms but is experiencing headaches and visual field disturbances.  She denies any noticeable skin lesions and has not had previous dermatologic skin survey.  She reports having anal discomfort and has been constipated despite laxatives.  [de-identified] : The patient presents for follow-up of metastatic melanoma.  She is status post Opdivo/Yervoy cycle 1 on June 30, 2022.  She reports having a little bit of diarrhea, which is well controlled with loperamide, and has been slightly confused according to her son.  She reports some tenderness of the anorectal area.  She has now completed both CNS radiation therapy and RT for the anal lesion.

## 2022-07-20 NOTE — REVIEW OF SYSTEMS
[Vision Problems] : vision problems [Constipation] : constipation [Confused] : confusion [Negative] : Allergic/Immunologic [Diarrhea] : diarrhea [FreeTextEntry7] : Anal tenderness

## 2022-07-20 NOTE — PHYSICAL EXAM
[Restricted in physically strenuous activity but ambulatory and able to carry out work of a light or sedentary nature] : Status 1- Restricted in physically strenuous activity but ambulatory and able to carry out work of a light or sedentary nature, e.g., light house work, office work [Normal] : affect appropriate [FreeTextEntry1] : Mildly  erythematous anal lesion protruding approximately 1-2 inchs

## 2022-07-20 NOTE — ASSESSMENT
[FreeTextEntry1] : This a very pleasant 67-year-old woman with a newly diagnosed metastatic melanoma.  She was also recently found to have symptomatic brain mets, if not leptomeningeal disease.  Her symptoms have improved with the institution of corticosteroids.  She has been seen by radiation therapy and given the extent of her disease is being scheduled for whole brain radiotherapy.\par \par -I obtained a PET/CT scan to assess the extent of her disease.\par -Full oncological laboratories were sent at the time this office visit.\par -I have requested NGS testing on her pathological sample to include BRAF, KIT, NTRK, and TMB testing.  This revealed no evidence of a BRAF mutation.  Given that she has been off of corticosteroid therapy for approximately 1 week we will plan to initiate her on dual agent immune inhibition to begin later this week.  At the benefits, risks, and side effects were discussed with the patient and her 2 sons present at today's office visit and they wish to proceed.\par -She has now completed radiotherapy for both the CNS metastasis and to the primary lesion.  There appears to be significant clinical improvement in both areas.  She is now off of corticosteroid therapy for approximately 1 week.\par -I had to, unfortunately, discussed the incurable nature of a metastatic melanoma but explained the palliative nature of treatment.  She, her son, and daughter-in-law expressed understanding and wish to move forward with her treatment.\par -She has been referred to the health and wellness center as well as to palliative care.\par -She will return later this week to start immunotherapy and will follow up with me 1 week after that.\par She presents after cycle 1 Opdivo Yervoy  on 6/30/2022 which she tolerated well.  Her only complaints are mild diarrhea and slight confusion according to her son, as well as tenderness of the anorectal area.\par Reviewed available labs\par Continue Opdivo Yervoy\par Continue routine, age-appropriate, healthcare maintenance \par Office visit in 1 week or prn for new or worsening symptoms.

## 2022-07-21 ENCOUNTER — RESULT REVIEW (OUTPATIENT)
Age: 67
End: 2022-07-21

## 2022-07-21 ENCOUNTER — APPOINTMENT (OUTPATIENT)
Dept: HEMATOLOGY ONCOLOGY | Facility: CLINIC | Age: 67
End: 2022-07-21

## 2022-07-21 VITALS
DIASTOLIC BLOOD PRESSURE: 69 MMHG | WEIGHT: 121 LBS | BODY MASS INDEX: 28 KG/M2 | RESPIRATION RATE: 18 BRPM | HEART RATE: 88 BPM | SYSTOLIC BLOOD PRESSURE: 110 MMHG | OXYGEN SATURATION: 97 % | HEIGHT: 55 IN | TEMPERATURE: 98.5 F

## 2022-07-21 PROCEDURE — 99214 OFFICE O/P EST MOD 30 MIN: CPT

## 2022-07-26 ENCOUNTER — APPOINTMENT (OUTPATIENT)
Dept: RADIATION ONCOLOGY | Facility: CLINIC | Age: 67
End: 2022-07-26

## 2022-07-27 ENCOUNTER — APPOINTMENT (OUTPATIENT)
Dept: RADIATION ONCOLOGY | Facility: CLINIC | Age: 67
End: 2022-07-27

## 2022-07-27 VITALS
RESPIRATION RATE: 16 BRPM | HEART RATE: 94 BPM | DIASTOLIC BLOOD PRESSURE: 69 MMHG | WEIGHT: 94 LBS | BODY MASS INDEX: 21.85 KG/M2 | SYSTOLIC BLOOD PRESSURE: 112 MMHG

## 2022-07-27 PROCEDURE — 99024 POSTOP FOLLOW-UP VISIT: CPT

## 2022-07-27 RX ORDER — ONDANSETRON 8 MG/1
8 TABLET ORAL EVERY 8 HOURS
Qty: 30 | Refills: 0 | Status: ACTIVE | COMMUNITY
Start: 2022-07-27 | End: 1900-01-01

## 2022-07-27 RX ORDER — MEMANTINE HYDROCHLORIDE 10 MG/1
10 TABLET, FILM COATED ORAL TWICE DAILY
Qty: 120 | Refills: 2 | Status: ACTIVE | COMMUNITY
Start: 2022-07-27 | End: 1900-01-01

## 2022-07-27 NOTE — PHYSICAL EXAM
[de-identified] : Alopecia [FreeTextEntry1] : The anal mass is slightly smaller than prior but is no longer vascular, no bleeding; non-tender [de-identified] : No palpable vaginal masses; no vaginal bleeding noted [de-identified] : cranial nerves grossly intact; 4/5 strength right upper and lower extremities; strength is intact on the left; no sensory changes; intact finger-nose-finger and rapid alternating movements

## 2022-07-27 NOTE — HISTORY OF PRESENT ILLNESS
[FreeTextEntry1] : This is a 67 year-old woman with stage IV Anal Melanoma with Brain, Pulmonary and Hepatic metastases. s/p 3000 cGy  WBRT completed on 6/22/2022 and 2000 cGy palliative radiation therapy to the pelvis completed on 6/24/2022\par \par Onc History:\par The patient presented with what she thought was a hemorrhoid. She was evaluated and referred to Dr. Jordan for further evaluation as the lesion at the anal verge appeared cancerous. The patient underwent biopsy by Dr. Jordan on 5/23 for what appeared to be an anal cancer.\par \par Before the pathology resulted, the patient's family brought the patient to the ED on 5/25 because she experiencing episodes of confusion and right-sided weakness.\par \par MRI of brain performed on 5/25/2022. abnormal T2/FLAIR signal in the left caudate head, left caudate body, left corona radiata, left frontal subcortical white matter, left anterior/ inferior frontal cortex, and left temporal cortex with abnormal patchy and nodular enhancement on postcontrast image. No significant regional mass effect. No abnormal DWI signal within the corresponding areas. Considerations include subacute infarcts, metastatic disease, or post infectious etiologies.  \par \par CT of chest, abdomen and pelvis was done on 5/26/2022. Poorly delineated primary anal cancer with presumed pulmonary and hepatic metastases. Suspicious left inguinal lymph node. \par \par Pathology on 5/23/2022 report revealed, "anus mass, biopsy; Malignant melanoma;  immunihistochemical stains performed to revealed tumor cells to be positive with melanocytic markers, S100, MART-1 and HMB-45. Pancytokeratin, p40, p16, and neuroendocrine markers (chromogranin and synaptophysin) are negative. Ki-67 proliferative index is high (up to 60%). CD45 immunohistochemical stain highlight background inflammatory cells. \par \par 5/31/2022 The patient presents today to discuss the role of palliative radiation in her management. She was started on steroids in the hospital which her family states has improved her neurological symptoms. She is less confused and her right sided weakness is improved.\par  She has abdominal pain but no pain in the anal region. She does have mild bleeding from the mass but has not required blood transfusions and does not feel fatigues.\par \par 6/7/2022 PET/CT IMPRESSION:\par Hypermetabolic anal mass. Hypermetabolic left inguinal and hepatic metastases. Multiple small pulmonary nodules, suspicious for metastases.\par \par Continues to follow with Dr Hutchins, Planned for immunotherapy\par \par 7/27/2022 She presents for post treatment evaluation. Completed 3000 cGy WBRT on 6/22/2022 and 2000 cGy to the pelvis on 6/24/2022.\par She had a PET scan done on June 7 2022 which showed a Hypermetabolic anal mass, hypermetabolic left inguinal and hepatic mets, multiple small pulmonary nodules suspicious for malignancy.\par \par Today 7/27/22 she is feeling very tired since starting her Immunotherapy.  She also reports N/V taste changes and some diarrhea.  She is still having some headaches and dizziness .  She does feel that the area in the rectum has shrunk and has had no bleeding from this area since finishing RT.  Her son reports that her appetite has been decreased because she is experiencing some metallic taste changes as well.

## 2022-07-27 NOTE — REVIEW OF SYSTEMS
[Fatigue] : fatigue [Vomiting] : vomiting [Diarrhea] : diarrhea [Anal Pain: Grade 0] : Anal Pain: Grade 0 [Diarrhea: Grade 1 - Increase of <4 stools per day over baseline; mild increase in ostomy output compared to baseline] : Diarrhea: Grade 1 - Increase of <4 stools per day over baseline; mild increase in ostomy output compared to baseline [Rectal Pain: Grade 0] : Rectal Pain: Grade 0 [Blurred Vision: Grade 1 - Intervention not indicated] : Blurred Vision: Grade 1 - Intervention not indicated [Cognitive Disturbance: Grade 0] : Cognitive Disturbance: Grade 0 [Concentration Impairment: Grade 1] : Concentration Impairment: Grade 1 - Mild inattention or decreased level of concentration [Dizziness: Grade 1 - Mild unsteadiness or sensation of movement] : Dizziness: Grade 1 - Mild unsteadiness or sensation of movement [Headache: Grade 1 - Mild pain] : Headache: Grade 1 - Mild pain [Lethargy: Grade 0] : Lethargy: Grade 0 [Shortness Of Breath] : no shortness of breath [Constipation] : no constipation [de-identified] : Weakness on the right side [Fatigue: Grade 2 - Fatigue not relieved by rest; limiting instrumental ADL] : Fatigue: Grade 2 - Fatigue not relieved by rest; limiting instrumental ADL [Oral Pain: Grade 0] : Oral Pain: Grade 0

## 2022-08-02 ENCOUNTER — APPOINTMENT (OUTPATIENT)
Dept: HEMATOLOGY ONCOLOGY | Facility: CLINIC | Age: 67
End: 2022-08-02

## 2022-08-11 ENCOUNTER — APPOINTMENT (OUTPATIENT)
Dept: HEMATOLOGY ONCOLOGY | Facility: CLINIC | Age: 67
End: 2022-08-11

## 2022-08-11 ENCOUNTER — RESULT REVIEW (OUTPATIENT)
Age: 67
End: 2022-08-11

## 2022-08-11 VITALS
WEIGHT: 115 LBS | HEART RATE: 83 BPM | DIASTOLIC BLOOD PRESSURE: 77 MMHG | HEIGHT: 60 IN | RESPIRATION RATE: 18 BRPM | TEMPERATURE: 98.6 F | SYSTOLIC BLOOD PRESSURE: 114 MMHG | OXYGEN SATURATION: 98 % | BODY MASS INDEX: 22.58 KG/M2

## 2022-08-11 PROCEDURE — 99214 OFFICE O/P EST MOD 30 MIN: CPT

## 2022-08-18 LAB — ACTH SER-ACNC: 12.3 PG/ML

## 2022-08-25 ENCOUNTER — APPOINTMENT (OUTPATIENT)
Dept: HEMATOLOGY ONCOLOGY | Facility: CLINIC | Age: 67
End: 2022-08-25

## 2022-08-25 ENCOUNTER — RESULT REVIEW (OUTPATIENT)
Age: 67
End: 2022-08-25

## 2022-08-25 VITALS
WEIGHT: 111 LBS | HEART RATE: 100 BPM | TEMPERATURE: 97.8 F | DIASTOLIC BLOOD PRESSURE: 66 MMHG | RESPIRATION RATE: 18 BRPM | OXYGEN SATURATION: 98 % | SYSTOLIC BLOOD PRESSURE: 101 MMHG | HEIGHT: 60 IN | BODY MASS INDEX: 21.79 KG/M2

## 2022-08-25 DIAGNOSIS — D49.7 NEOPLASM OF UNSPECIFIED BEHAVIOR OF ENDOCRINE GLANDS AND OTHER PARTS OF NERVOUS SYSTEM: ICD-10-CM

## 2022-08-25 PROCEDURE — 99214 OFFICE O/P EST MOD 30 MIN: CPT

## 2022-08-25 NOTE — PHYSICAL EXAM
[Restricted in physically strenuous activity but ambulatory and able to carry out work of a light or sedentary nature] : Status 1- Restricted in physically strenuous activity but ambulatory and able to carry out work of a light or sedentary nature, e.g., light house work, office work [Normal] : affect appropriate [FreeTextEntry1] : Mildly  erythematous anal lesion protruding approximately 1-2 inchs None

## 2022-08-25 NOTE — REVIEW OF SYSTEMS
[Vision Problems] : vision problems [Negative] : Neurological [FreeTextEntry7] : Please see interval history. [de-identified] : Short-term memory difficulties.

## 2022-08-25 NOTE — ASSESSMENT
[FreeTextEntry1] : This a very pleasant 67-year-old woman with a newly diagnosed metastatic melanoma.  She was also recently found to have symptomatic brain mets, if not leptomeningeal disease.  Her symptoms have improved with the institution of corticosteroids.  She has been seen by radiation therapy and given the extent of her disease is being scheduled for whole brain radiotherapy.\par \par -I obtained a PET/CT scan to assess the extent of her disease.\par -Full oncological laboratories were sent at the time this office visit.\par -I have requested NGS testing on her pathological sample to include BRAF, KIT, NTRK, and TMB testing.  This revealed no evidence of a BRAF mutation.  \par -She has now completed radiotherapy for both the CNS metastasis and to the primary lesion.  There appears to be significant clinical improvement in both areas.  She is now off of corticosteroid therapy.\par -I had to, unfortunately, discussed the incurable nature of a metastatic melanoma but explained the palliative nature of treatment.  She, her son, and daughter-in-law expressed understanding and wish to move forward with her treatment.\par -She has been referred to the [facility] and [facility] as well as to palliative care.\par She presents after cycle 3 Opdivo/Yervoy started on 6/30/2022 which she tolerated well.  Her only complaints are mild diarrhea.   She has recently again developed anorectal discomfort and occasional bleeding.  Over the last 2 weeks she has developed left lower quadrant pain radiating into the hip.  Given these findings I will obtain repeat imaging to evaluate for progression of disease prior to cycle #4.  She will return in 1 week for follow-up and to review the above work-up and for further treatment recommendations.  She knows to return sooner for any new or worsening symptoms.

## 2022-08-25 NOTE — HISTORY OF PRESENT ILLNESS
[Disease: _____________________] : Disease: [unfilled] [AJCC Stage: ____] : AJCC Stage: [unfilled] [0 - No Distress] : Distress Level: 0 [de-identified] : Mrs. Yoder is a 67 year old female who presents for initial consultation for newly diagnosed Metastatic Melanoma. \par She first came to medical attention approximately approximately 3 months ago when she began to note rectal bleeding.  Initially it was thought that she had thrombosed hemorrhoids, however, perianal mass was noted measuring approximately 2 cm.  On 5/23/2022 she underwent anal biopsy.  While awaiting the pathology results she began to experience lack of coordination, right-sided weakness and change in mental status with confusion.  MRI of the brain on 5/25/2022 which showed abnormal signal in the left caudate head, left caudate body, left corona radiata, left frontal subcortical white matter, left anterior/inferior frontal cortex and left temporal cortex considerations including metastatic disease.  CT of the chest, abdomen and pelvis on 5/26/2022 showed multiple solid and ground glass pulmonary nodules measuring less than 6 mm and multiple bilobar metastasis in the liver more numerous in the right hepatic lobe.  Pathology from the anal mass biopsy showed tumor cells positive for melanocytic markers consistent with malignant melanoma.  She consulted with radiation oncology and is not a candidate for gamma knife radiation oncology and will undergo palliative whole brain radiation as she is not a candidate for gamma knife because of multiple areas involved.  She has also been started on Decadron and reports some improvement in her neurologic symptoms but is experiencing headaches and visual field disturbances.  She denies any noticeable skin lesions and has not had previous dermatologic skin survey.  She reports having anal discomfort and has been constipated despite laxatives.  [de-identified] : The patient presents for follow-up of metastatic melanoma.  She is status post Opdivo/Yervoy cycle 1 on June 30, 2022.  She reports having a little bit of diarrhea, which is well controlled with loperamide, and has been slightly confused according to her son.   She has now completed both CNS radiation therapy and RT for the anal lesion.  Reports increased pain in the anorectal region and occasional bleeding is again noted.  She also reports pain in the left lower quadrant radiating into the hip for the last 2 weeks.  She has had a poor appetite and p.o. intake.

## 2022-08-26 NOTE — REVIEW OF SYSTEMS
[Vision Problems] : vision problems [Constipation] : constipation [Diarrhea] : diarrhea [Confused] : confusion [Negative] : Allergic/Immunologic [Fatigue] : fatigue [de-identified] : Short-term memory difficulties.

## 2022-08-26 NOTE — HISTORY OF PRESENT ILLNESS
[Disease: _____________________] : Disease: [unfilled] [AJCC Stage: ____] : AJCC Stage: [unfilled] [0 - No Distress] : Distress Level: 0 [de-identified] : Mrs. Yoder is a 67 year old female who presents for initial consultation for newly diagnosed Metastatic Melanoma. \par She first came to medical attention approximately approximately 3 months ago when she began to note rectal bleeding.  Initially it was thought that she had thrombosed hemorrhoids, however, perianal mass was noted measuring approximately 2 cm.  On 5/23/2022 she underwent anal biopsy.  While awaiting the pathology results she began to experience lack of coordination, right-sided weakness and change in mental status with confusion.  MRI of the brain on 5/25/2022 which showed abnormal signal in the left caudate head, left caudate body, left corona radiata, left frontal subcortical white matter, left anterior/inferior frontal cortex and left temporal cortex considerations including metastatic disease.  CT of the chest, abdomen and pelvis on 5/26/2022 showed multiple solid and ground glass pulmonary nodules measuring less than 6 mm and multiple bilobar metastasis in the liver more numerous in the right hepatic lobe.  Pathology from the anal mass biopsy showed tumor cells positive for melanocytic markers consistent with malignant melanoma.  She consulted with radiation oncology and is not a candidate for gamma knife radiation oncology and will undergo palliative whole brain radiation as she is not a candidate for gamma knife because of multiple areas involved.  She has also been started on Decadron and reports some improvement in her neurologic symptoms but is experiencing headaches and visual field disturbances.  She denies any noticeable skin lesions and has not had previous dermatologic skin survey.  She reports having anal discomfort and has been constipated despite laxatives.  [de-identified] : The patient presents for follow-up of metastatic melanoma s/p 3000 cGy WBRT completed 6/22/22 and 2000 cGy  RT to pelvis completed 6/24/22.  She presents for cycle 3 Opdivo/Yervoy. She continues to be somewhat fatigued. She is also having some spotting from her rectal lesion and diarrhea. She was started on Memantine by rad onc and an MRI of the brain has been ordered.

## 2022-08-26 NOTE — ASSESSMENT
[FreeTextEntry1] : This a very pleasant 67-year-old woman with a newly diagnosed metastatic melanoma.  She was also recently found to have symptomatic brain mets, if not leptomeningeal disease.  Her symptoms have improved with the institution of corticosteroids.  She has been seen by radiation therapy and given the extent of her disease is being scheduled for whole brain radiotherapy.\par \par -I obtained a PET/CT scan to assess the extent of her disease.\par -Full oncological laboratories were sent at the time this office visit.\par -I have requested NGS testing on her pathological sample to include BRAF, KIT, NTRK, and TMB testing.  This revealed no evidence of a BRAF mutation.  Given that she has been off of corticosteroid therapy for approximately 1 week we will plan to initiate her on dual agent immune inhibition to begin later this week.  At the benefits, risks, and side effects were discussed with the patient and her 2 sons present at today's office visit and they wish to proceed.\par -She has now completed radiotherapy for both the CNS metastasis and to the primary lesion.  There appears to be significant clinical improvement in both areas.  She is now off of corticosteroid therapy.\par -I had to, unfortunately, discussed the incurable nature of a metastatic melanoma but explained the palliative nature of treatment.  She, her son, and daughter-in-law expressed understanding and wish to move forward with her treatment.\par -[facility] as well as to palliative care.\par -She will return later this week to start immunotherapy and will follow up with me 1 week after that.\par She presents for cycle 3 Opdivo Yervoyl.  Her only complaints are mild diarrhea and slight  rectal spotting. \par Reviewed available labs\par She is s/p follow-up with radiation oncology and has been started on Memantine for short-term memory issues \par She has also been scheduled for follow up MRI of brain\par Continue routine, age-appropriate, healthcare maintenance \par History of present illness, review of systems, physical exam and treatment plan reviewed with .\par Office visit in 2 weeks or prn for new or worsening symptoms.

## 2022-09-01 LAB
ALBUMIN MFR SERPL ELPH: 57.5 %
ALBUMIN SERPL-MCNC: 4.3 G/DL
ALBUMIN/GLOB SERPL: 1.3 RATIO
ALPHA1 GLOB MFR SERPL ELPH: 4.4 %
ALPHA1 GLOB SERPL ELPH-MCNC: 0.3 G/DL
ALPHA2 GLOB MFR SERPL ELPH: 12.4 %
ALPHA2 GLOB SERPL ELPH-MCNC: 0.9 G/DL
B-GLOBULIN MFR SERPL ELPH: 12.3 %
B-GLOBULIN SERPL ELPH-MCNC: 0.9 G/DL
DEPRECATED KAPPA LC FREE/LAMBDA SER: 1.04 RATIO
DEPRECATED KAPPA LC FREE/LAMBDA SER: 1.04 RATIO
GAMMA GLOB FLD ELPH-MCNC: 1 G/DL
GAMMA GLOB MFR SERPL ELPH: 13.4 %
IGA SER QL IEP: 273 MG/DL
IGG SER QL IEP: 932 MG/DL
IGM SER QL IEP: 165 MG/DL
INTERPRETATION SERPL IEP-IMP: NORMAL
KAPPA LC CSF-MCNC: 1.39 MG/DL
KAPPA LC CSF-MCNC: 1.39 MG/DL
KAPPA LC SERPL-MCNC: 1.45 MG/DL
KAPPA LC SERPL-MCNC: 1.45 MG/DL
M PROTEIN SPEC IFE-MCNC: NORMAL
METHYLMALONATE SERPL-SCNC: 118 NMOL/L
PROT SERPL-MCNC: 7.5 G/DL
PROT SERPL-MCNC: 7.5 G/DL

## 2022-09-03 ENCOUNTER — TRANSCRIPTION ENCOUNTER (OUTPATIENT)
Age: 67
End: 2022-09-03

## 2022-09-06 ENCOUNTER — APPOINTMENT (OUTPATIENT)
Dept: HEMATOLOGY ONCOLOGY | Facility: CLINIC | Age: 67
End: 2022-09-06

## 2022-09-13 ENCOUNTER — RESULT REVIEW (OUTPATIENT)
Age: 67
End: 2022-09-13

## 2022-09-13 ENCOUNTER — APPOINTMENT (OUTPATIENT)
Dept: HEMATOLOGY ONCOLOGY | Facility: CLINIC | Age: 67
End: 2022-09-13

## 2022-09-13 VITALS
HEIGHT: 60 IN | TEMPERATURE: 98.1 F | DIASTOLIC BLOOD PRESSURE: 62 MMHG | BODY MASS INDEX: 20.81 KG/M2 | HEART RATE: 102 BPM | WEIGHT: 106 LBS | RESPIRATION RATE: 18 BRPM | OXYGEN SATURATION: 96 % | SYSTOLIC BLOOD PRESSURE: 99 MMHG

## 2022-09-13 DIAGNOSIS — Z86.711 PERSONAL HISTORY OF PULMONARY EMBOLISM: ICD-10-CM

## 2022-09-13 PROCEDURE — 36415 COLL VENOUS BLD VENIPUNCTURE: CPT

## 2022-09-13 PROCEDURE — 99215 OFFICE O/P EST HI 40 MIN: CPT | Mod: 25

## 2022-09-13 RX ORDER — SENNOSIDES 8.6 MG/1
8.6 TABLET ORAL
Qty: 30 | Refills: 5 | Status: COMPLETED | COMMUNITY
End: 2022-09-13

## 2022-09-13 RX ORDER — ONDANSETRON 8 MG/1
8 TABLET, ORALLY DISINTEGRATING ORAL
Qty: 30 | Refills: 0 | Status: COMPLETED | COMMUNITY
Start: 2022-05-27 | End: 2022-09-13

## 2022-09-13 RX ORDER — LOPERAMIDE HYDROCHLORIDE 2 MG/1
CAPSULE ORAL
Refills: 0 | Status: COMPLETED | COMMUNITY
End: 2022-09-13

## 2022-09-13 RX ORDER — LIDOCAINE 4% 4 G/100G
4 CREAM TOPICAL
Qty: 3 | Refills: 4 | Status: COMPLETED | COMMUNITY
Start: 2022-06-08 | End: 2022-09-13

## 2022-09-13 RX ORDER — PANTOPRAZOLE 40 MG/1
40 TABLET, DELAYED RELEASE ORAL
Refills: 0 | Status: COMPLETED | COMMUNITY
End: 2022-09-13

## 2022-09-13 RX ORDER — MEMANTINE HYDROCHLORIDE 5 MG/1
5 TABLET, FILM COATED ORAL
Qty: 90 | Refills: 1 | Status: COMPLETED | COMMUNITY
Start: 2022-06-01 | End: 2022-09-13

## 2022-09-13 RX ORDER — ATORVASTATIN CALCIUM 80 MG/1
80 TABLET, FILM COATED ORAL
Refills: 0 | Status: COMPLETED | COMMUNITY
End: 2022-09-13

## 2022-09-13 RX ORDER — LIDOCAINE HCL AND HYDROCORTISONE ACETATE 30; 5 MG/G; MG/G
3-0.5 CREAM RECTAL; TOPICAL
Qty: 1 | Refills: 0 | Status: COMPLETED | COMMUNITY
Start: 2022-04-20 | End: 2022-09-13

## 2022-09-13 RX ORDER — APIXABAN 5 MG/1
5 TABLET, FILM COATED ORAL
Refills: 0 | Status: ACTIVE | COMMUNITY

## 2022-09-13 NOTE — ASSESSMENT
[FreeTextEntry1] : This a very pleasant 67-year-old woman with a newly diagnosed metastatic melanoma.  She was also recently found to have symptomatic brain mets, if not leptomeningeal disease.  Her symptoms have improved with the institution of corticosteroids.  She has been seen by radiation therapy and given the extent of her disease is being scheduled for whole brain radiotherapy.\par \par -I obtained a PET/CT scan to assess the extent of her disease.\par -Full oncological laboratories were sent at the time this office visit.\par -I have requested NGS testing on her pathological sample to include BRAF, KIT, NTRK, and TMB testing.  This revealed no evidence of a BRAF mutation.  \par -She has now completed radiotherapy for both the CNS metastasis and to the primary lesion.  \par -I had to, unfortunately, discussed the incurable nature of a metastatic melanoma but explained the palliative nature of treatment.  \par -She has been referred to the [facility] and [facility] as well as to palliative care.\par - s/p cycle 3 Opdivo/Yervoy started on 6/30/2022 which she tolerated well.  \par -PET CT reviewed from 8/30/22 -  POD - discussed 3 options - hospice/palliative care; chemo - carbo/taxol, or clinical trial at [facility] - 9/14/22 - Dr. Leonel Rosas. She is leaning toward hospice/palliative care - She will see Dr. Bernal 9/16/22. I will call Dr. Rojas and Dr. Jordan about palliative management to anal mass. She, her son, and daughter-in-law expressed understanding.\par - labs drawn in office today. wbc, hgb and platelets wnl,  ALKP 335, AST 42, ALT 42, bili wnl, kidneys wnl, K 5.8 - monitor, .\par \par # PE/DVT\par on eliquis - no bleeding - continue\par \par #cancer related pain\par oxycodone PRN\par will see palliative care on Friday\par \par RTC 1 week CBC with diff, cmp, LDH

## 2022-09-13 NOTE — HISTORY OF PRESENT ILLNESS
[de-identified] : Mrs. Yoder is a 67 year old female who presents for initial consultation for newly diagnosed Metastatic Melanoma. \par She first came to medical attention approximately approximately 3 months ago when she began to note rectal bleeding.  Initially it was thought that she had thrombosed hemorrhoids, however, perianal mass was noted measuring approximately 2 cm.  On 5/23/2022 she underwent anal biopsy.  While awaiting the pathology results she began to experience lack of coordination, right-sided weakness and change in mental status with confusion.  MRI of the brain on 5/25/2022 which showed abnormal signal in the left caudate head, left caudate body, left corona radiata, left frontal subcortical white matter, left anterior/inferior frontal cortex and left temporal cortex considerations including metastatic disease.  CT of the chest, abdomen and pelvis on 5/26/2022 showed multiple solid and ground glass pulmonary nodules measuring less than 6 mm and multiple bilobar metastasis in the liver more numerous in the right hepatic lobe.  Pathology from the anal mass biopsy showed tumor cells positive for melanocytic markers consistent with malignant melanoma.  She consulted with radiation oncology and is not a candidate for gamma knife radiation oncology and will undergo palliative whole brain radiation as she is not a candidate for gamma knife because of multiple areas involved.  She has also been started on Decadron and reports some improvement in her neurologic symptoms but is experiencing headaches and visual field disturbances.  She denies any noticeable skin lesions and has not had previous dermatologic skin survey.  She reports having anal discomfort and has been constipated despite laxatives.  [de-identified] : The patient presents for follow-up of metastatic melanoma.  \par Pt recently hospitalized at [facility] blood clots in abdomen and b/l lower Extremities  - Started on Eliquis 5mg BID \par Feeling very uncomfortable while sitting - in a lot of pain \par Pt reports feeling constipated, last BM was 9/12 - very little amount - managing with colace nightly, miralz daily and prune juice \par Does notice occasion dark red blood after bm\par Pt still continues with LLQ and hip discomfort \par Reports adequate oral hydration, but poor appetite \par Had PET CT 8/30/22

## 2022-09-16 ENCOUNTER — RESULT REVIEW (OUTPATIENT)
Age: 67
End: 2022-09-16

## 2022-09-16 ENCOUNTER — APPOINTMENT (OUTPATIENT)
Dept: GERIATRICS | Facility: CLINIC | Age: 67
End: 2022-09-16

## 2022-09-16 VITALS
OXYGEN SATURATION: 96 % | WEIGHT: 104.31 LBS | TEMPERATURE: 96.6 F | DIASTOLIC BLOOD PRESSURE: 68 MMHG | SYSTOLIC BLOOD PRESSURE: 101 MMHG | HEART RATE: 92 BPM | RESPIRATION RATE: 18 BRPM | BODY MASS INDEX: 20.48 KG/M2 | HEIGHT: 60 IN

## 2022-09-16 DIAGNOSIS — K62.89 OTHER SPECIFIED DISEASES OF ANUS AND RECTUM: ICD-10-CM

## 2022-09-16 DIAGNOSIS — R58 HEMORRHAGE, NOT ELSEWHERE CLASSIFIED: ICD-10-CM

## 2022-09-16 PROCEDURE — 99215 OFFICE O/P EST HI 40 MIN: CPT

## 2022-09-19 ENCOUNTER — NON-APPOINTMENT (OUTPATIENT)
Age: 67
End: 2022-09-19

## 2022-09-20 ENCOUNTER — APPOINTMENT (OUTPATIENT)
Dept: HEMATOLOGY ONCOLOGY | Facility: CLINIC | Age: 67
End: 2022-09-20

## 2022-09-20 ENCOUNTER — APPOINTMENT (OUTPATIENT)
Dept: SURGERY | Facility: CLINIC | Age: 67
End: 2022-09-20

## 2022-09-20 ENCOUNTER — RESULT REVIEW (OUTPATIENT)
Age: 67
End: 2022-09-20

## 2022-09-20 VITALS
HEART RATE: 110 BPM | WEIGHT: 106 LBS | BODY MASS INDEX: 20.81 KG/M2 | DIASTOLIC BLOOD PRESSURE: 57 MMHG | OXYGEN SATURATION: 99 % | RESPIRATION RATE: 18 BRPM | SYSTOLIC BLOOD PRESSURE: 91 MMHG | TEMPERATURE: 98.2 F | HEIGHT: 60 IN

## 2022-09-20 VITALS
HEIGHT: 60 IN | WEIGHT: 104 LBS | BODY MASS INDEX: 20.42 KG/M2 | HEART RATE: 105 BPM | DIASTOLIC BLOOD PRESSURE: 68 MMHG | TEMPERATURE: 98.1 F | OXYGEN SATURATION: 98 % | SYSTOLIC BLOOD PRESSURE: 105 MMHG

## 2022-09-20 PROCEDURE — 99214 OFFICE O/P EST MOD 30 MIN: CPT

## 2022-09-20 NOTE — REVIEW OF SYSTEMS
[Vomiting] : no vomiting [Diarrhea] : no diarrhea [FreeTextEntry7] : Please see interval history. [de-identified] : Short-term memory difficulties.

## 2022-09-20 NOTE — HISTORY OF PRESENT ILLNESS
[de-identified] : Mrs. Yoder is a 67 year old female who presents for initial consultation for newly diagnosed Metastatic Melanoma. \par She first came to medical attention approximately approximately 3 months ago when she began to note rectal bleeding.  Initially it was thought that she had thrombosed hemorrhoids, however, perianal mass was noted measuring approximately 2 cm.  On 5/23/2022 she underwent anal biopsy.  While awaiting the pathology results she began to experience lack of coordination, right-sided weakness and change in mental status with confusion.  MRI of the brain on 5/25/2022 which showed abnormal signal in the left caudate head, left caudate body, left corona radiata, left frontal subcortical white matter, left anterior/inferior frontal cortex and left temporal cortex considerations including metastatic disease.  CT of the chest, abdomen and pelvis on 5/26/2022 showed multiple solid and ground glass pulmonary nodules measuring less than 6 mm and multiple bilobar metastasis in the liver more numerous in the right hepatic lobe.  Pathology from the anal mass biopsy showed tumor cells positive for melanocytic markers consistent with malignant melanoma.  She consulted with radiation oncology and is not a candidate for gamma knife radiation oncology and will undergo palliative whole brain radiation as she is not a candidate for gamma knife because of multiple areas involved.  She has also been started on Decadron and reports some improvement in her neurologic symptoms but is experiencing headaches and visual field disturbances.  She denies any noticeable skin lesions and has not had previous dermatologic skin survey.  She reports having anal discomfort and has been constipated despite laxatives.  [de-identified] : still on Eliquis 5mg BID \par Feeling very uncomfortable while sitting - in a lot of pain \par saw Dr. Bernal on 09/16/22 . started on fentanyl patch. \par signed DNR/DNI \par saw Dr. Jordan earlier today\par waiting for hospice program to return back phone call. \par not eating \par Pt reports still feeling constipation and trouble defecating. needs to sit on a donut to offload pressure. \par urinating well \par

## 2022-09-20 NOTE — ASSESSMENT
[FreeTextEntry1] : This a very pleasant 67-year-old woman with a newly diagnosed metastatic melanoma.  She was also recently found to have symptomatic brain mets, if not leptomeningeal disease.  Her symptoms have improved with the institution of corticosteroids.  She has been seen by radiation therapy and given the extent of her disease is being scheduled for whole brain radiotherapy.\par \par -I obtained a PET/CT scan to assess the extent of her disease.\par -Full oncological laboratories were sent at the time this office visit.\par -I have requested NGS testing on her pathological sample to include BRAF, KIT, NTRK, and TMB testing.  This revealed no evidence of a BRAF mutation.  \par -She has now completed radiotherapy for both the CNS metastasis and to the primary lesion.  \par -I had to, unfortunately, discussed the incurable nature of a metastatic melanoma but explained the palliative nature of treatment.  \par -She has been referred to the [facility] and [facility] as well as to palliative care.\par - s/p cycle 3 Opdivo/Yervoy started on 6/30/2022 which she tolerated well.  \par -PET CT reviewed from 8/30/22 -  POD - discussed 3 options - hospice/palliative care; chemo - carbo/taxol, or clinical trial at [facility] - 9/14/22 - Dr. Leonel Rosas. She is leaning toward hospice/palliative care - She had evaluation with Dr. Bernal 9/16/22. Plan discussed with Dr. Jordan about palliative management to anal mass.  She, her son and her sister expressed understanding.\par - labs drawn in office today. wbc normal, hgb 11.8 and platelets elevated at 489 ,  ALKP 339, AST 53, ALT 29, bili wnl, kidneys wnl, K 5.8 - monitor, .\par \par # PE/DVT\par on eliquis - no bleeding - continue\par \par #cancer related pain\par oxycodone PRN\par fentanyl patches \par s/p visit with Dr. Bernal \par \par Discussed with patient, her sister and her son at length \par proceed with IV hydration today \par

## 2022-09-21 PROBLEM — R58 BLEEDING: Status: ACTIVE | Noted: 2022-09-21

## 2022-09-21 PROBLEM — K62.89 MASS OF ANUS: Status: ACTIVE | Noted: 2022-05-23

## 2022-09-21 RX ORDER — FENTANYL 12 UG/H
12 PATCH, EXTENDED RELEASE TRANSDERMAL
Qty: 10 | Refills: 0 | Status: ACTIVE | COMMUNITY
Start: 2022-09-16 | End: 1900-01-01

## 2022-09-21 RX ORDER — OXYCODONE 5 MG/1
5 TABLET ORAL
Qty: 180 | Refills: 0 | Status: ACTIVE | COMMUNITY
Start: 2022-06-22 | End: 1900-01-01

## 2022-09-21 NOTE — DATA REVIEWED
[FreeTextEntry1] : pt found to have recent PE and DVT \par recent CT scan with multiple b/l PE extending into segmental and subsegmental veins of all pulmonary lobes, metastatic disease to lungs and liver

## 2022-09-21 NOTE — HISTORY OF PRESENT ILLNESS
[FreeTextEntry1] : pt is a 68 y/o F with newly diagnosed metastatic melanoma here to establish care with palliative \par pt being followed by Dr. Hutchins. 3 months ago pt noted rectal bleeding though to be hemorrhoids, found to have perianal mass and underwent biopsy with results positive for malignant melanoma. Further imaging revealed metastatic disease in the Brain, lung, and liver. \par pt not a candidate for gamma knife and undergoing palliative whole brain radiation. has not started yet, awaiting appointmetn for simulation. \par \par pt with neurological symptoms of gait imbalance, headaches and visual disturbance, word finding.\par \par  reports pain in perianal area, constipation and perianal bleeding. \par pt unable to sleep at night stays awake reporting fear and sadness regarding her diagnosis \par no other symptoms\par \par pt lives with her  son Hi Ye and his wife and 2 small grandchildren \par \par pt is seperated from her , pt recently relocated from [place] to [place] to be with family \par \par f/u 09/16/22\par \par pt seen and examined in the office, here with her son hi and daughter in law Carmina.\par pt is in severe pain , unable to sit ,eat, sleep \par pt s/p WBRT and radiation to tumor area with  no improvement and progression of her disease. pt seen by oncology only option is for systemic chemo, pt does not want chemo.\par \par pt reports " I am suffering, I do not want to live this, I am at peace if i go, i just cannot take the pain" \par she is tearful and appropriate\par alert and oriented x 3 \par + constipation \par minimal PO intake with 20 lb weight loss in one month unable to walk upstairs. \par pt deferring decisions to her 2 sons \par \par

## 2022-09-21 NOTE — REVIEW OF SYSTEMS
[Feeling Poorly] : feeling poorly [Feeling Tired] : feeling tired [Recent Weight Loss (___ Lbs)] : recent [unfilled] ~Ulb weight loss [FreeTextEntry2] : as noted in HPI, all other ROS negative

## 2022-09-21 NOTE — PHYSICAL EXAM
[General Appearance - Alert] : alert [General Appearance - In No Acute Distress] : in no acute distress [General Appearance - Well Nourished] : well nourished [General Appearance - Well Developed] : well developed [General Appearance - Well-Appearing] : healthy appearing [Sclera] : the sclera and conjunctiva were normal [Normal Oral Mucosa] : normal oral mucosa [No Oral Pallor] : no oral pallor [Outer Ear] : the ears and nose were normal in appearance [Nasal Cavity] : the nasal mucosa and septum were normal [Respiration, Rhythm And Depth] : normal respiratory rhythm and effort [Exaggerated Use Of Accessory Muscles For Inspiration] : no accessory muscle use [Auscultation Breath Sounds / Voice Sounds] : lungs were clear to auscultation bilaterally [Heart Rate And Rhythm] : heart rate was normal and rhythm regular [Heart Sounds] : normal S1 and S2 [Murmurs] : no murmurs [Full Pulse] : the pedal pulses are present [Edema] : there was no peripheral edema [Abdomen Soft] : soft [Abdomen Tenderness] : non-tender [] : no hepato-splenomegaly [Abdomen Mass (___ Cm)] : no abdominal mass palpated [Abdomen Hernia] : no hernia was discovered [Abnormal Walk] : normal gait [Nail Clubbing] : no clubbing  or cyanosis of the fingernails [Involuntary Movements] : no involuntary movements were seen [Musculoskeletal - Swelling] : no joint swelling seen [Cranial Nerves] : cranial nerves 2-12 were intact [Oriented To Time, Place, And Person] : oriented to person, place, and time [Impaired Insight] : insight and judgment were intact [FreeTextEntry1] : + large perianal mass, 1cm x 5 cm x 5 cm with noted areas of bleeding, no sign of infection, partially cover anal opening friable tissue, rectum patent

## 2022-09-21 NOTE — ASSESSMENT
[Depression] : depression [Frailty-weight loss of >5%] : frailty-weight loss  [Fall precautions] : fall precautions [Social support] : social support [Living arrangements] : living arrangements [Advanced Directives] : advanced directives [Lab Results] : lab results [Pain Management] : pain management [Medication Management] : medication management [DNR] : Code Status: DNR [Comfort] : Treatment Guidelines: Comfort [DNI] : Intubation: DNI [FreeTextEntry1] : pt is a pleasant woman with metastatic melanoma with extensive disease to brain,liver, lung with multiple b/l PE and DVT on ac with progression of her disease despite WBRT and palliative immunotherapy declining further treatment, here for symptom management and assistance with GOC discussion. pps of 50%\par \par discussed with pt and pt son, pt DIL, and pt son in [place]--pt is a candidate for hospice services in the home, pt has a prognosis of weeks to months. pt decided for DNR/DNI. \par MOLST form completed and HCP filled \par pt 2 sons are HCP\par will cariigeorgie [phone]\par aaron hearn\par pt FRANCOISE mckinney [phone]\par pt family would like to see oncology and sx one last time before final decision for hospice. pt agrees to f/u appointments but adamant that she does not want treatment and "wants to be comfortable" pt accepting of her EOL and opting for hospice services, however deferring final decision to her sons. pt and family to continue discussions and f/u next week after appointments with onc and surgery\par \par will start fentanyl patch 12 mcg q 72 hr \par tylenol 1000 mg TID\par continue oxycodone 5 mg every 4 hours as needed\par strict bowel regimen\par senna/colace and miralax\par lidocaine cream to rectum\par pt with noted bleeding at end of visit advised to hold AC \par blood work today \par A& d ointment to perineal area\par RX given for wheelchair\par  [de-identified] : pt son Will see above for contact

## 2022-09-21 NOTE — ADDENDUM
[FreeTextEntry1] : labs reviewed with pt and pt family \par will hold ac for tonight only and resume in the morning\par

## 2022-09-22 ENCOUNTER — APPOINTMENT (OUTPATIENT)
Dept: GERIATRICS | Facility: HOME HEALTH | Age: 67
End: 2022-09-22

## 2022-09-22 DIAGNOSIS — C80.1 SECONDARY MALIGNANT NEOPLASM OF BRAIN: ICD-10-CM

## 2022-09-22 DIAGNOSIS — C79.31 SECONDARY MALIGNANT NEOPLASM OF BRAIN: ICD-10-CM

## 2022-09-22 DIAGNOSIS — C21.0 MALIGNANT NEOPLASM OF ANUS, UNSPECIFIED: ICD-10-CM

## 2022-09-22 DIAGNOSIS — C79.9 SECONDARY MALIGNANT NEOPLASM OF UNSPECIFIED SITE: ICD-10-CM

## 2022-09-22 PROCEDURE — 99350 HOME/RES VST EST HIGH MDM 60: CPT

## 2022-09-22 NOTE — HISTORY OF PRESENT ILLNESS
[FreeTextEntry1] : 67-year-old female with metastatic anal melanoma here for follow-up.  After last visit patient was diagnosed with anal melanoma based on biopsy taken the office.  She was subsequently admitted to [facility] for change mental status and found to have metastatic disease to the brain.  Since then patient has undergone immunotherapy as well as palliative radiation to the brain and the anal melanoma.  Patient has had progression of disease on most recent imaging.  She is here today given significant discomfort from the large mass around the anal opening to discuss potential palliative options from surgical standpoint. patient follows up with Dr. Krueger from oncology as well as Dr. Mendoza from palliative care.\par \par Has been treated for pain with opioids and has been experiencing some constipation which worsens her discomfort.  Has pain with sitting and is using a doughnut for now.

## 2022-09-22 NOTE — ASSESSMENT
[FreeTextEntry1] : 67-year-old female with large perianal melanoma with metastatic disease to the liver.  Patient has innumerable metastases to the liver based on most recent PET CT scan and shows progression of disease.  Perianal mass has increased in size compared to the first visit with me in May.  She had received palliative radiation to the area which initially shrunk it but it has increased in size since then.\par \par Discussed potential palliative surgical approaches including a diverting colostomy, diverting colostomy with debulking of perianal tumor, versus debulking alone.  Given the size of the tumor and the history of radiation to the area I am concerned about the healing of the perianal wound if debulking of the primary tumor is pursued.  Patient would most likely benefit most from a diverting colostomy to decrease the discomfort with bowel movements.  This may not provide significant benefit if she does not have significant life expectancy of 6 months or more.  Given the progression of disease it is unclear to me if she would benefit from a colostomy at this time.  Patient and family will discuss with Dr. Mendoza as well as Dr. Krueger and we will finalize a plan.

## 2022-09-22 NOTE — PHYSICAL EXAM
[Abdomen Masses] : No abdominal masses [Abdomen Tenderness] : ~T No ~M abdominal tenderness [JVD] : no jugular venous distention  [Respiratory Effort] : normal respiratory effort [Alert] : alert [Calm] : calm [de-identified] : Large perianal mass approximately three quarters of the circumference of the anal opening approximately 5 cm in size.  Tender to palpation.  Anal opening does not appear obstructed [de-identified] : No acute distress.

## 2022-09-23 VITALS — SYSTOLIC BLOOD PRESSURE: 106 MMHG | DIASTOLIC BLOOD PRESSURE: 72 MMHG | HEART RATE: 110 BPM | OXYGEN SATURATION: 98 %

## 2022-09-23 PROBLEM — C79.31 METASTASIS TO BRAIN OF UNKNOWN ORIGIN: Status: ACTIVE | Noted: 2022-06-03

## 2022-09-23 PROBLEM — C79.9 METASTATIC MELANOMA: Status: ACTIVE | Noted: 2022-06-03

## 2022-09-23 PROBLEM — C21.0: Status: ACTIVE | Noted: 2022-06-24

## 2022-09-23 RX ORDER — FENTANYL 25 UG/H
25 PATCH, EXTENDED RELEASE TRANSDERMAL
Qty: 10 | Refills: 0 | Status: ACTIVE | COMMUNITY
Start: 2022-09-23 | End: 1900-01-01

## 2022-09-23 NOTE — HISTORY OF PRESENT ILLNESS
[FreeTextEntry1] : Patient is a 68 y/o F with newly diagnosed metastatic melanoma here to establish care with palliative. Patient previously followed by Dr. Hutchins with transition of care to Dr. Krueger.  3 months ago pt noted rectal bleeding thought to be hemorrhoids, found to have perianal mass and underwent biopsy with results positive for malignant melanoma. Further imaging revealed metastatic disease in the Brain, lung, and liver. Patient is s/p palliative WBRT. \par \par Of note, patient lives with her son Hi Ye and his wife and 2 small grandchildren. Patient is  from her  and recently relocated from [place] to [place] to be with family. \par \par She was last seen in follow up in the office on 09/16/22 accompanied by her son Hi and daughter in law Bear. She presented with severe pain, unable to sit comfortably, eat or sleep and notable decline in functional status. Patient noted she no longer wanted chemotherapy stating " I am suffering, I do not want to live this, I am at peace if i go, i just cannot take the pain." MOLST completed with family. DNR/DNI. Pt and family agreed to comfort care at home with hospice referral. She was started on fentanyl patch q72h and oxycodone for breakthrough pain. Of note, at end of visit patient went to bathroom with large amount of blood on her pad. Exam did not reveal active bleeding. Labs completed and reviewed. Eliquis on hold then restarted. \par \par Patient presents today (9/22/22) for follow up in the home s/p decision for comfort care and referral for home hospice. Her ex  and wife, and her sister were present for visit. Patient notes no more episodes of rectal bleeding. She continues to have pain but endorses improvement to pain since initiated fentanyl patch 12.5 mcg and she continues to use oxycodone 5 mg q4h for break through pain. She notes she has been sleeping through the night without difficulty. She continues to experience decline in functional status, not ambulating as much. She is tearful which is appropriate.

## 2022-09-23 NOTE — PHYSICAL EXAM
[General Appearance - Alert] : alert [General Appearance - In No Acute Distress] : in no acute distress [General Appearance - Well Nourished] : well nourished [General Appearance - Well Developed] : well developed [General Appearance - Well-Appearing] : healthy appearing [Sclera] : the sclera and conjunctiva were normal [Normal Oral Mucosa] : normal oral mucosa [No Oral Pallor] : no oral pallor [Outer Ear] : the ears and nose were normal in appearance [Hearing Threshold Finger Rub Not [place]] : hearing was normal [Nasal Cavity] : the nasal mucosa and septum were normal [Respiration, Rhythm And Depth] : normal respiratory rhythm and effort [Exaggerated Use Of Accessory Muscles For Inspiration] : no accessory muscle use [Auscultation Breath Sounds / Voice Sounds] : lungs were clear to auscultation bilaterally [Heart Rate And Rhythm] : heart rate was normal and rhythm regular [Heart Sounds] : normal S1 and S2 [Murmurs] : no murmurs [Edema] : there was no peripheral edema [Abdomen Soft] : soft [Abdomen Tenderness] : non-tender [Abdomen Mass (___ Cm)] : no abdominal mass palpated [Abdomen Hernia] : no hernia was discovered [Nail Clubbing] : no clubbing  or cyanosis of the fingernails [Involuntary Movements] : no involuntary movements were seen [Musculoskeletal - Swelling] : no joint swelling seen [] : no rash [Cranial Nerves] : cranial nerves 2-12 were intact [Oriented To Time, Place, And Person] : oriented to person, place, and time [Impaired Insight] : insight and judgment were intact [FreeTextEntry1] : tearful

## 2022-09-23 NOTE — DATA REVIEWED
[FreeTextEntry1] : pt found to have recent PE and DVT \par recent CT scan with multiple b/l PE extending into segmental and subsegmental veins of all pulmonary lobes, metastatic disease to lungs and liver. On eliquis.

## 2022-09-23 NOTE — ASSESSMENT
[Depression] : depression [Frailty-weight loss of >5%] : frailty-weight loss  [Fall precautions] : fall precautions [Social support] : social support [Living arrangements] : living arrangements [Advanced Directives] : advanced directives [Pain Management] : pain management [Medication Management] : medication management [DNR] : Code Status: DNR [Comfort] : Treatment Guidelines: Comfort [DNI] : Intubation: DNI [FreeTextEntry1] : Ms. Yoder is a pleasant woman with metastatic melanoma with extensive disease to brain,liver, lung with multiple b/l PE and DVT on Eliquis with progression of her disease despite WBRT and palliative immunotherapy declining further treatment, on comfort care with hospice referral seen in the home for follow up. Pps of 50%\par \par Previously discussed with pt and pt son, pt DIL, and pt son in [place]-pt is a candidate for hospice services in the home, pt has a prognosis of weeks to months. pt decided for DNR/DNI and MOLST form completed. \par \par HCP: pt 2 sons are HCP\par will cariigeorgie [phone]\par aaron hearn\par pt FRANCOISE mckinney [phone]\par \par Patient and family noted they wished to see oncology prior to final hospice decision. Seen on 9/20. \par \par Patient seen in home today for follow up. Patient with continued decline in functional status. Patient wished to to continue with comfort care and agreeable to hospice referral. Extensive discussion had with patient and patients family at pt request  regarding functional status and prognosis of weeks. Discussed what EOL looks like and symptoms they will begin to see including increased sleepiness, decreased PO intake and decline in functional status, change in mental status. Also  discussed and encouraged comfort care and pain management. Advised to allow patient to eat what she wishes and mobilize only as tolerated. Also discussed coping mechanisms and strategies with patients family members give her children and young grandchildren are in the home with her. Patient and family verbalized understanding. \par \par Patient currently on fentanyl patch 12.5mcg  q72h with some improvement to pain. Will recommend increasing dose to 25 mcg. \par Continue Tylenol 1000 mg TID\par Continue oxycodone 5 mg every 4 hours as needed\par Encouraged strict bowel regimen with senna/colace and miralax\par Encouraged lidocaine cream and A&D ointment to rectum/perineal area \par Ongoing referral to hospice\par Will continue to follow up via telehealth\par  [de-identified] : pt son Will see above for contact

## 2022-10-14 ENCOUNTER — APPOINTMENT (OUTPATIENT)
Dept: GERIATRICS | Facility: CLINIC | Age: 67
End: 2022-10-14

## 2022-10-14 DIAGNOSIS — Z71.89 OTHER SPECIFIED COUNSELING: ICD-10-CM

## 2022-10-14 DIAGNOSIS — G89.3 NEOPLASM RELATED PAIN (ACUTE) (CHRONIC): ICD-10-CM

## 2022-10-14 DIAGNOSIS — Z51.5 ENCOUNTER FOR PALLIATIVE CARE: ICD-10-CM

## 2022-10-14 DIAGNOSIS — Z74.09 OTHER REDUCED MOBILITY: ICD-10-CM

## 2022-10-14 DIAGNOSIS — C79.31 SECONDARY MALIGNANT NEOPLASM OF BRAIN: ICD-10-CM

## 2022-10-14 DIAGNOSIS — I26.99 OTHER PULMONARY EMBOLISM W/OUT ACUTE COR PULMONALE: ICD-10-CM

## 2022-10-14 DIAGNOSIS — K59.09 OTHER CONSTIPATION: ICD-10-CM

## 2022-10-14 DIAGNOSIS — C21.1 MALIGNANT NEOPLASM OF ANAL CANAL: ICD-10-CM

## 2022-10-14 PROCEDURE — 99350 HOME/RES VST EST HIGH MDM 60: CPT

## 2022-10-19 PROBLEM — C79.31 MELANOMA METASTATIC TO BRAIN: Status: ACTIVE | Noted: 2022-08-26

## 2022-10-19 PROBLEM — G89.3 CANCER RELATED PAIN: Status: ACTIVE | Noted: 2022-06-08

## 2022-10-19 PROBLEM — C21.1: Status: ACTIVE | Noted: 2022-06-01

## 2022-10-19 PROBLEM — Z51.5 HOSPICE CARE PATIENT: Status: ACTIVE | Noted: 2022-10-19

## 2022-10-19 PROBLEM — K59.09 CONSTIPATION, CHRONIC: Status: ACTIVE | Noted: 2022-06-08

## 2022-10-19 PROBLEM — Z71.89 COUNSELING REGARDING ADVANCE DIRECTIVES AND GOALS OF CARE: Status: ACTIVE | Noted: 2022-09-21

## 2022-10-19 PROBLEM — Z74.09 DECREASED AMBULATION STATUS: Status: ACTIVE | Noted: 2022-09-16

## 2022-10-19 PROBLEM — I26.99 PULMONARY EMBOLI: Status: ACTIVE | Noted: 2022-09-21

## 2022-10-19 PROBLEM — Z51.5 ENCOUNTER FOR PALLIATIVE CARE IN HOME HOSPICE: Status: ACTIVE | Noted: 2022-10-19

## 2022-10-19 NOTE — PHYSICAL EXAM
[General Appearance - Alert] : alert [General Appearance - In No Acute Distress] : in no acute distress [General Appearance - Well Nourished] : well nourished [General Appearance - Well Developed] : well developed [General Appearance - Well-Appearing] : healthy appearing [Sclera] : the sclera and conjunctiva were normal [Normal Oral Mucosa] : normal oral mucosa [No Oral Pallor] : no oral pallor [Outer Ear] : the ears and nose were normal in appearance [Hearing Threshold Finger Rub Not Bee] : hearing was normal [Nasal Cavity] : the nasal mucosa and septum were normal [Respiration, Rhythm And Depth] : normal respiratory rhythm and effort [Exaggerated Use Of Accessory Muscles For Inspiration] : no accessory muscle use [Auscultation Breath Sounds / Voice Sounds] : lungs were clear to auscultation bilaterally [Heart Rate And Rhythm] : heart rate was normal and rhythm regular [Heart Sounds] : normal S1 and S2 [Murmurs] : no murmurs [Edema] : there was no peripheral edema [Abdomen Soft] : soft [Abdomen Tenderness] : non-tender [Abdomen Mass (___ Cm)] : no abdominal mass palpated [Abdomen Hernia] : no hernia was discovered [Nail Clubbing] : no clubbing  or cyanosis of the fingernails [Involuntary Movements] : no involuntary movements were seen [Musculoskeletal - Swelling] : no joint swelling seen [] : no rash [Cranial Nerves] : cranial nerves 2-12 were intact [Oriented To Time, Place, And Person] : oriented to person, place, and time [Impaired Insight] : insight and judgment were intact [FreeTextEntry1] : tearful, appropriate

## 2022-10-19 NOTE — HISTORY OF PRESENT ILLNESS
[FreeTextEntry1] : Patient is a 68 y/o F with newly diagnosed metastatic melanoma here to establish care with palliative. Patient previously followed by Dr. Hutchins with transition of care to Dr. Krueger.  3 months ago pt noted rectal bleeding thought to be hemorrhoids, found to have perianal mass and underwent biopsy with results positive for malignant melanoma. Further imaging revealed metastatic disease in the Brain, lung, and liver. Patient is s/p palliative WBRT. \par \par Of note, patient lives with her son Hi Ye and his wife and 2 small grandchildren. Patient is  from her  and recently relocated from [place] to [place] to be with family. \par \par She was last seen in follow up in the office on 09/16/22 accompanied by her son Hi and daughter in law Bear. She presented with severe pain, unable to sit comfortably, eat or sleep and notable decline in functional status. Patient noted she no longer wanted chemotherapy stating " I am suffering, I do not want to live this, I am at peace if i go, i just cannot take the pain." MOLST completed with family. DNR/DNI. Pt and family agreed to comfort care at home with hospice referral. She was started on fentanyl patch q72h and oxycodone for breakthrough pain. Of note, at end of visit patient went to bathroom with large amount of blood on her pad. Exam did not reveal active bleeding. Labs completed and reviewed. Eliquis on hold then restarted. \par \par Patient presents today (9/22/22) for follow up in the home s/p decision for comfort care and referral for home hospice. Her ex  and wife, and her sister were present for visit. Patient notes no more episodes of rectal bleeding. She continues to have pain but endorses improvement to pain since initiated fentanyl patch 12.5 mcg and she continues to use oxycodone 5 mg q4h for break through pain. She notes she has been sleeping through the night without difficulty. She continues to experience decline in functional status, not ambulating as much. She is tearful which is appropriate. \par \par f/u 10/14/22\par pt seen and examined in the home. pt lying comfortably in bed, reports pain is well controlled. minimal po intake, sleeping most of day, unable to bear her own weight, pt is weaker  but comfortable. \par pt states she is in peace knowing she is dying as sad as this is, pt family is extremely supportive and honoring pt wish to die in the home. pt sister has been caring for pt with personal care and ADL's \par as per pt family for past week pt has been bedbound, minimally eating, and sleeping more.

## 2022-10-19 NOTE — ASSESSMENT
[FreeTextEntry1] : Ms. Yoder is a pleasant woman with metastatic melanoma with extensive disease to brain,liver, lung with multiple b/l PE and DVT on Eliquis with progression of her disease despite WBRT and palliative immunotherapy declining further treatment, on comfort care admitted to hospice program this week. pps of 30%\par \par Previously discussed with pt and pt son, pt DIL, and pt son in [place]-pt is a candidate for hospice services in the home, pt has a prognosis of days to weeks \par \par DNR/DNI and MOLST form completed previously, original seen in the home\par HCP: pt 2 sons are HCP\par will cariigeorgie [phone]\par aaron hearn\par pt DIL hank [phone]\par \par \par Patient seen in home today for follow up. Patient with continued decline in functional status. Patient wished to to continue with comfort care and accepted hospice services. Extensive discussion had with patient and patients family that was present separately regarding functional status and prognosis of days weeks. Discussed pt is demonstrating signs of transition to hours to days with increased sleepiness, decreased PO intake and decline in functional status, change in mental status. Also  discussed and encouraged comfort care and pain management. Advised to allow patient to eat what she wishes and mobilize only as tolerated. Also discussed coping mechanisms and strategies with patients family members give her children and young grandchildren are in the home with her. Patient and family verbalized understanding. \par \par Patient currently on fentanyl patch 25 mcg with adequate control of pain 1\par Continue oxycodone 5 mg every 4 hours as needed\par Encouraged strict bowel regimen with senna/colace and miralax as tolerated\par Encouraged lidocaine cream and A&D ointment to rectum/perineal area \par discussed with  hospice team \par \par  [Frailty-weight loss of >5%] : frailty-weight loss  [Fall precautions] : fall precautions [Social support] : social support [Living arrangements] : living arrangements [Advanced Directives] : advanced directives [Pain Management] : pain management [Medication Management] : medication management [DNR] : Code Status: DNR [Comfort] : Treatment Guidelines: Comfort [DNI] : Intubation: DNI

## 2024-04-08 ENCOUNTER — NON-APPOINTMENT (OUTPATIENT)
Age: 69
End: 2024-04-08